# Patient Record
Sex: FEMALE | Race: WHITE | Employment: PART TIME | URBAN - METROPOLITAN AREA
[De-identification: names, ages, dates, MRNs, and addresses within clinical notes are randomized per-mention and may not be internally consistent; named-entity substitution may affect disease eponyms.]

---

## 2019-07-06 ENCOUNTER — HOSPITAL ENCOUNTER (EMERGENCY)
Age: 69
Discharge: HOME | End: 2019-07-06
Payer: MEDICARE

## 2019-07-06 VITALS
OXYGEN SATURATION: 93 % | BODY MASS INDEX: 26.68 KG/M2 | WEIGHT: 145 LBS | SYSTOLIC BLOOD PRESSURE: 131 MMHG | DIASTOLIC BLOOD PRESSURE: 75 MMHG | HEART RATE: 68 BPM | RESPIRATION RATE: 18 BRPM | HEIGHT: 62 IN | TEMPERATURE: 97.7 F

## 2019-07-06 DIAGNOSIS — K08.89 PAIN, DENTAL: Primary | ICD-10-CM

## 2019-07-06 PROCEDURE — 99212 OFFICE O/P EST SF 10 MIN: CPT

## 2019-07-06 RX ORDER — BUPROPION HYDROCHLORIDE 150 MG/1
150 TABLET, EXTENDED RELEASE ORAL DAILY
COMMUNITY

## 2019-07-06 RX ORDER — GABAPENTIN 100 MG/1
100 CAPSULE ORAL 3 TIMES DAILY
COMMUNITY

## 2019-07-06 RX ORDER — AMOXICILLIN 500 MG/1
500 CAPSULE ORAL 3 TIMES DAILY
Qty: 30 CAPSULE | Refills: 0 | Status: SHIPPED | OUTPATIENT
Start: 2019-07-06 | End: 2019-07-16

## 2019-07-06 RX ORDER — BUSPIRONE HYDROCHLORIDE 10 MG/1
10 TABLET ORAL 2 TIMES DAILY
COMMUNITY

## 2019-07-06 ASSESSMENT — PAIN DESCRIPTION - DESCRIPTORS: DESCRIPTORS: SHOOTING

## 2019-07-06 ASSESSMENT — PAIN DESCRIPTION - LOCATION: LOCATION: FACE;EAR

## 2019-07-06 ASSESSMENT — PAIN SCALES - WONG BAKER: WONGBAKER_NUMERICALRESPONSE: 2

## 2019-07-06 ASSESSMENT — PAIN DESCRIPTION - PROGRESSION: CLINICAL_PROGRESSION: NOT CHANGED

## 2019-07-06 ASSESSMENT — PAIN DESCRIPTION - ORIENTATION: ORIENTATION: RIGHT

## 2019-07-06 ASSESSMENT — PAIN DESCRIPTION - ONSET: ONSET: ON-GOING

## 2019-07-06 ASSESSMENT — PAIN SCALES - GENERAL: PAINLEVEL_OUTOF10: 2

## 2019-07-06 ASSESSMENT — PAIN DESCRIPTION - PAIN TYPE: TYPE: ACUTE PAIN

## 2019-07-06 ASSESSMENT — PAIN DESCRIPTION - FREQUENCY: FREQUENCY: CONTINUOUS

## 2019-07-06 NOTE — ED PROVIDER NOTES
soon as possible if not go to the dental clinic if pain is getting worse over the next day or to go to the ER at [facility] since they have dental residents. --------------------------------------------- PAST HISTORY ---------------------------------------------  Past Medical History:  has a past medical history of Hyperlipidemia, Hypertension, and Neuropathy. Past Surgical History:  has a past surgical history that includes Tonsillectomy; hernia repair; Appendectomy; Foot surgery; fracture surgery; and eye surgery. Social History:  reports that she quit smoking about a year ago. She does not have any smokeless tobacco history on file. She reports that she drinks about 1.2 oz of alcohol per week. She reports that she does not use drugs. Family History: family history is not on file. The patients home medications have been reviewed. Allergies: Patient has no known allergies. -------------------------------------------------- RESULTS -------------------------------------------------  No results found for this visit on 07/06/19. No orders to display       ------------------------- NURSING NOTES AND VITALS REVIEWED ---------------------------   The nursing notes within the ED encounter and vital signs as below have been reviewed. /75   Pulse 68   Temp 97.7 °F (36.5 °C) (Oral)   Resp 18   Ht 5' 2\" (1.575 m)   Wt 145 lb (65.8 kg)   SpO2 93%   BMI 26.52 kg/m²   Oxygen Saturation Interpretation: Normal      ------------------------------------------ PROGRESS NOTES ------------------------------------------   I have spoken with the patient and discussed todays results, in addition to providing specific details for the plan of care and counseling regarding the diagnosis and prognosis.   Their questions are answered at this time and they are agreeable with the plan.      --------------------------------- ADDITIONAL PROVIDER NOTES ---------------------------------     This

## 2022-09-19 ENCOUNTER — OFFICE VISIT (OUTPATIENT)
Dept: NEUROSURGERY | Age: 72
End: 2022-09-19
Payer: MEDICARE

## 2022-09-19 VITALS
WEIGHT: 145 LBS | TEMPERATURE: 97.5 F | BODY MASS INDEX: 26.68 KG/M2 | OXYGEN SATURATION: 96 % | RESPIRATION RATE: 16 BRPM | HEART RATE: 71 BPM | HEIGHT: 62 IN | DIASTOLIC BLOOD PRESSURE: 71 MMHG | SYSTOLIC BLOOD PRESSURE: 137 MMHG

## 2022-09-19 DIAGNOSIS — M48.02 CERVICAL STENOSIS OF SPINAL CANAL: Primary | ICD-10-CM

## 2022-09-19 PROCEDURE — 1090F PRES/ABSN URINE INCON ASSESS: CPT | Performed by: PHYSICIAN ASSISTANT

## 2022-09-19 PROCEDURE — G8427 DOCREV CUR MEDS BY ELIG CLIN: HCPCS | Performed by: PHYSICIAN ASSISTANT

## 2022-09-19 PROCEDURE — 3017F COLORECTAL CA SCREEN DOC REV: CPT | Performed by: PHYSICIAN ASSISTANT

## 2022-09-19 PROCEDURE — G8417 CALC BMI ABV UP PARAM F/U: HCPCS | Performed by: PHYSICIAN ASSISTANT

## 2022-09-19 PROCEDURE — 99202 OFFICE O/P NEW SF 15 MIN: CPT

## 2022-09-19 PROCEDURE — 1036F TOBACCO NON-USER: CPT | Performed by: PHYSICIAN ASSISTANT

## 2022-09-19 PROCEDURE — 99203 OFFICE O/P NEW LOW 30 MIN: CPT | Performed by: PHYSICIAN ASSISTANT

## 2022-09-19 PROCEDURE — 1123F ACP DISCUSS/DSCN MKR DOCD: CPT | Performed by: PHYSICIAN ASSISTANT

## 2022-09-19 PROCEDURE — G8400 PT W/DXA NO RESULTS DOC: HCPCS | Performed by: PHYSICIAN ASSISTANT

## 2022-09-19 RX ORDER — ALENDRONATE SODIUM 70 MG/1
TABLET ORAL
COMMUNITY
Start: 2022-08-15

## 2022-09-19 RX ORDER — BUSPIRONE HYDROCHLORIDE 15 MG/1
TABLET ORAL
COMMUNITY
Start: 2022-08-15

## 2022-09-19 ASSESSMENT — ENCOUNTER SYMPTOMS
GASTROINTESTINAL NEGATIVE: 1
ALLERGIC/IMMUNOLOGIC NEGATIVE: 1
EYES NEGATIVE: 1
RESPIRATORY NEGATIVE: 1

## 2022-09-19 NOTE — PROGRESS NOTES
Subjective:      Patient ID: Taylor Vickers is a 70 y.o. female. Neck Pain   This is a new problem. Episode onset: 4 months. The problem occurs constantly. The problem has been gradually worsening. The pain is present in the midline (and right arm pain into the hand. ). The quality of the pain is described as aching and shooting. The pain is at a severity of 9/10. She has tried chiropractic manipulation (gabapentin, motrin,) for the symptoms. The treatment provided mild relief. Review of Systems   Constitutional: Negative. HENT: Negative. Eyes: Negative. Respiratory: Negative. Cardiovascular: Negative. Gastrointestinal: Negative. Endocrine: Negative. Genitourinary: Negative. Musculoskeletal:  Positive for neck pain. Skin: Negative. Allergic/Immunologic: Negative. Neurological: Negative. Hematological: Negative. Psychiatric/Behavioral: Negative. Objective:   Physical Exam  Constitutional:       Appearance: Normal appearance. HENT:      Head: Normocephalic and atraumatic. Nose: Nose normal.   Eyes:      Pupils: Pupils are equal, round, and reactive to light. Pulmonary:      Effort: Pulmonary effort is normal.   Abdominal:      General: There is no distension. Skin:     General: Skin is warm and dry. Neurological:      Mental Status: She is alert. GCS: GCS eye subscore is 4. GCS verbal subscore is 5. GCS motor subscore is 6. Cranial Nerves: Cranial nerves are intact. Sensory: Sensory deficit present. Motor: Weakness present. Coordination: Heel to [facility] Test abnormal.      Gait: Gait abnormal.      Deep Tendon Reflexes:      Reflex Scores:       Tricep reflexes are 3+ on the right side and 3+ on the left side. Bicep reflexes are 3+ on the right side and 3+ on the left side. Brachioradialis reflexes are 3+ on the right side and 3+ on the left side.        Patellar reflexes are 3+ on the right side and 3+ on the left side.       Achilles reflexes are 3+ on the right side and 3+ on the left side. Comments: 4/5 hand intrinsics    Decreased sensation to LT in both hands   Psychiatric:         Mood and Affect: Mood normal.       Assessment:      70year old female with severe neck and right > left arm pain, numbness and weakness. She has had no relief with NSAIDS, gabapentin, and 2 different chiropractors. Plan:      She will follow up with her cervical MRI disc that was done at [facility].         ZEYAD Lau

## 2022-09-20 ENCOUNTER — OFFICE VISIT (OUTPATIENT)
Dept: NEUROSURGERY | Age: 72
End: 2022-09-20
Payer: MEDICARE

## 2022-09-20 VITALS
OXYGEN SATURATION: 94 % | BODY MASS INDEX: 26.68 KG/M2 | WEIGHT: 145 LBS | HEIGHT: 62 IN | HEART RATE: 84 BPM | SYSTOLIC BLOOD PRESSURE: 124 MMHG | DIASTOLIC BLOOD PRESSURE: 80 MMHG | TEMPERATURE: 97.2 F | RESPIRATION RATE: 16 BRPM

## 2022-09-20 DIAGNOSIS — M48.02 CERVICAL STENOSIS OF SPINAL CANAL: Primary | ICD-10-CM

## 2022-09-20 PROCEDURE — G8427 DOCREV CUR MEDS BY ELIG CLIN: HCPCS | Performed by: PHYSICIAN ASSISTANT

## 2022-09-20 PROCEDURE — 99202 OFFICE O/P NEW SF 15 MIN: CPT

## 2022-09-20 PROCEDURE — 1123F ACP DISCUSS/DSCN MKR DOCD: CPT | Performed by: PHYSICIAN ASSISTANT

## 2022-09-20 PROCEDURE — G8417 CALC BMI ABV UP PARAM F/U: HCPCS | Performed by: PHYSICIAN ASSISTANT

## 2022-09-20 PROCEDURE — 1090F PRES/ABSN URINE INCON ASSESS: CPT | Performed by: PHYSICIAN ASSISTANT

## 2022-09-20 PROCEDURE — 3017F COLORECTAL CA SCREEN DOC REV: CPT | Performed by: PHYSICIAN ASSISTANT

## 2022-09-20 PROCEDURE — 99213 OFFICE O/P EST LOW 20 MIN: CPT | Performed by: PHYSICIAN ASSISTANT

## 2022-09-20 PROCEDURE — G8400 PT W/DXA NO RESULTS DOC: HCPCS | Performed by: PHYSICIAN ASSISTANT

## 2022-09-20 PROCEDURE — 1036F TOBACCO NON-USER: CPT | Performed by: PHYSICIAN ASSISTANT

## 2022-09-20 ASSESSMENT — ENCOUNTER SYMPTOMS
EYES NEGATIVE: 1
RESPIRATORY NEGATIVE: 1
ALLERGIC/IMMUNOLOGIC NEGATIVE: 1
GASTROINTESTINAL NEGATIVE: 1

## 2022-09-20 NOTE — PROGRESS NOTES
Subjective:      Patient ID: Luis Alberto Soriano is a 70 y.o. female. Neck Pain   This is a new problem. Episode onset: 4 months. The problem occurs constantly. The problem has been gradually worsening. The pain is present in the midline (and right arm pain into the hand. ). The quality of the pain is described as aching and shooting. The pain is at a severity of 9/10. She has tried chiropractic manipulation (gabapentin, motrin,) for the symptoms. The treatment provided mild relief. Review of Systems   Constitutional: Negative. HENT: Negative. Eyes: Negative. Respiratory: Negative. Cardiovascular: Negative. Gastrointestinal: Negative. Endocrine: Negative. Genitourinary: Negative. Musculoskeletal:  Positive for neck pain. Skin: Negative. Allergic/Immunologic: Negative. Neurological: Negative. Hematological: Negative. Psychiatric/Behavioral: Negative. Objective:   Physical Exam  Constitutional:       Appearance: Normal appearance. HENT:      Head: Normocephalic and atraumatic. Nose: Nose normal.   Eyes:      Pupils: Pupils are equal, round, and reactive to light. Pulmonary:      Effort: Pulmonary effort is normal.   Abdominal:      General: There is no distension. Skin:     General: Skin is warm and dry. Neurological:      Mental Status: She is alert. GCS: GCS eye subscore is 4. GCS verbal subscore is 5. GCS motor subscore is 6. Cranial Nerves: Cranial nerves are intact. Sensory: Sensory deficit present. Motor: Weakness present. Coordination: Heel to [facility] Test abnormal.      Gait: Gait abnormal.      Deep Tendon Reflexes:      Reflex Scores:       Tricep reflexes are 3+ on the right side and 3+ on the left side. Bicep reflexes are 3+ on the right side and 3+ on the left side. Brachioradialis reflexes are 3+ on the right side and 3+ on the left side.        Patellar reflexes are 3+ on the right side and 3+ on the left side.       Achilles reflexes are 3+ on the right side and 3+ on the left side. Comments: 4/5 hand intrinsics    Decreased sensation to LT in both hands   Psychiatric:         Mood and Affect: Mood normal.       Assessment:      70year old female with severe neck and right > left arm pain, numbness and weakness. She has had no relief with NSAIDS, gabapentin, and 2 different chiropractors. 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 Aisha Barrett PA    I have interviewed and examined the patient and agree. 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 I am recommending C3-C4, C4-C5 and C5-C6 anterior cervical diskectomy and fusion.   She needs medical clearance    Deepak Lozada MD

## 2022-10-14 ENCOUNTER — TELEPHONE (OUTPATIENT)
Dept: NEUROSURGERY | Age: 72
End: 2022-10-14

## 2022-10-14 NOTE — TELEPHONE ENCOUNTER
Patient called stating she would like to schedule surgery. She is working on her PCP and cardio clearance.

## 2022-10-19 ENCOUNTER — PREP FOR PROCEDURE (OUTPATIENT)
Dept: NEUROSURGERY | Age: 72
End: 2022-10-19

## 2022-10-19 DIAGNOSIS — M50.20 DISPLACEMENT OF CERVICAL INTERVERTEBRAL DISC WITHOUT MYELOPATHY: Primary | ICD-10-CM

## 2022-10-24 ENCOUNTER — PREP FOR PROCEDURE (OUTPATIENT)
Dept: NEUROSURGERY | Age: 72
End: 2022-10-24

## 2022-10-24 DIAGNOSIS — Z01.818 PRE-OP TESTING: Primary | ICD-10-CM

## 2022-10-24 RX ORDER — SODIUM CHLORIDE 0.9 % (FLUSH) 0.9 %
5-40 SYRINGE (ML) INJECTION EVERY 12 HOURS SCHEDULED
Status: CANCELLED | OUTPATIENT
Start: 2022-10-24

## 2022-10-24 RX ORDER — SODIUM CHLORIDE 9 MG/ML
INJECTION, SOLUTION INTRAVENOUS PRN
Status: CANCELLED | OUTPATIENT
Start: 2022-10-24

## 2022-10-24 RX ORDER — SODIUM CHLORIDE 0.9 % (FLUSH) 0.9 %
5-40 SYRINGE (ML) INJECTION PRN
Status: CANCELLED | OUTPATIENT
Start: 2022-10-24

## 2022-10-24 RX ORDER — SODIUM CHLORIDE 9 MG/ML
INJECTION, SOLUTION INTRAVENOUS CONTINUOUS
Status: CANCELLED | OUTPATIENT
Start: 2022-10-24

## 2022-11-14 ENCOUNTER — PREP FOR PROCEDURE (OUTPATIENT)
Dept: NEUROSURGERY | Age: 72
End: 2022-11-14

## 2022-11-14 DIAGNOSIS — Z01.818 PRE-OP TESTING: Primary | ICD-10-CM

## 2022-11-14 RX ORDER — ALBUTEROL SULFATE 90 UG/1
AEROSOL, METERED RESPIRATORY (INHALATION)
COMMUNITY

## 2022-11-14 RX ORDER — BUPROPION HYDROCHLORIDE 150 MG/1
150 TABLET ORAL DAILY
COMMUNITY
Start: 2022-08-15 | End: 2022-11-14

## 2022-11-14 RX ORDER — ALENDRONATE SODIUM 70 MG/1
TABLET ORAL
COMMUNITY

## 2022-11-14 RX ORDER — ERGOCALCIFEROL (VITAMIN D2) 1250 MCG
50000 CAPSULE ORAL WEEKLY
COMMUNITY

## 2022-11-14 RX ORDER — BUSPIRONE HYDROCHLORIDE 15 MG/1
15 TABLET ORAL 2 TIMES DAILY
COMMUNITY

## 2022-11-14 RX ORDER — GABAPENTIN 300 MG/1
300 CAPSULE ORAL 3 TIMES DAILY
COMMUNITY
Start: 2022-08-15

## 2022-11-14 NOTE — PROGRESS NOTES
[address]   PRE-ADMISSION TESTING GENERAL INSTRUCTIONS  [facility] Phone Number: [phone]      GENERAL INSTRUCTIONS:    [x] Antibacterial Soap Shower Night before surgery. [x] CHG Wipes instruction sheet and wipes given. [x] Do not wear makeup, lotions, powders, deodorant. [x] Nothing by mouth after midnight; including gum, candy, mints, or water. [x] You may brush your teeth, gargle, but do NOT swallow water. [x] No tobacco products, illegal drugs, or alcohol within 24 hours of your surgery. [x] Jewelry or valuables should not be brought to the hospital. All body and/or tongue piercing's must be removed prior to arriving to hospital. No contact lens or hair pins. [x] Arrange transportation with a responsible adult  to and from the hospital. Arrange for someone to be with you for the remainder of the day and for 24 hours after your procedure due to having had anesthesia. -Who will be your  for transportation?___Tara__         -Who will be staying with you for 24 hrs after your procedure?__________________  [x] Bring insurance card and photo ID. [x] Transfusion Bracelet: Please bring with you to hospital, day of surgery. PARKING INSTRUCTIONS:     [x] ARRIVAL DATE & TIME: 11/28/22 @ 0730am  [x] Enter into the [employer] Two people may accompany you. Masks are not required but are recommended. [x] Parking Lot \"I\" is where you will park. It is located on the corner of [address] and [address]. The entrance is on [address]. Upon entering the parking lot, a voucher ticket will print    EDUCATION INSTRUCTIONS:        [x] Pre-admission Testing educational folder given  [x] Incentive Spirometry,coughing & deep breathing exercises reviewed. [x] Medication information sheet(s)   [x] Fluoroscopy-Xray used in surgery reviewed with patient. Educational pamphlet placed in chart. [x] Pain: Post-op pain is normal and to be expected.  You will be asked to rate your pain from 0-10. MEDICATION INSTRUCTIONS:    [x] Bring a complete list of your medications, please write the last time you took the medicine, give this list to the nurse in Pre-Op. [x] Take only the following medications the morning of surgery with 1-2 ounces of water: Buspar, gabapentin, wellbutrin, bring albuterol   [x] Stop all herbal supplements and vitamins 5 days before surgery. Stop NSAIDS 7 days before surgery. [x] Use your inhalers the morning of surgery. Bring your emergency inhaler with you day of surgery. [x] Follow physician instructions regarding any blood thinners you may be taking. WHAT TO EXPECT:    [x] The day of surgery you will be greeted and checked in by the [employer].  In addition, you will be registered in the [place] by a Patient Access Representative. Please bring your photo ID and insurance card. A nurse will greet you in accordance to the time you are needed in the pre-op area to prepare you for surgery. Please do not be discouraged if you are not greeted in the order you arrive as there are many variables that are involved in patient preparation. Your patience is greatly appreciated as you wait for your nurse. Please bring in items such as: books, magazines, newspapers, electronics, or any other items  to occupy your time in the waiting area. [x]  Delays may occur with surgery and staff will make a sincere effort to keep you informed of delays. If any delays occur with your procedure, we apologize ahead of time for your inconvenience as we recognize the value of your time.

## 2022-11-18 ENCOUNTER — HOSPITAL ENCOUNTER (OUTPATIENT)
Dept: GENERAL RADIOLOGY | Age: 72
Discharge: HOME | End: 2022-11-20
Payer: MEDICARE

## 2022-11-18 ENCOUNTER — HOSPITAL ENCOUNTER (OUTPATIENT)
Dept: PREADMISSION TESTING | Age: 72
Discharge: HOME | End: 2022-11-18
Payer: MEDICARE

## 2022-11-18 VITALS
RESPIRATION RATE: 18 BRPM | OXYGEN SATURATION: 97 % | HEART RATE: 73 BPM | DIASTOLIC BLOOD PRESSURE: 61 MMHG | SYSTOLIC BLOOD PRESSURE: 138 MMHG | WEIGHT: 143 LBS | TEMPERATURE: 97.2 F | BODY MASS INDEX: 26.31 KG/M2 | HEIGHT: 62 IN

## 2022-11-18 DIAGNOSIS — Z01.818 PRE-OP TESTING: ICD-10-CM

## 2022-11-18 DIAGNOSIS — R91.1 LUNG NODULE: Primary | ICD-10-CM

## 2022-11-18 DIAGNOSIS — Z01.812 PRE-OPERATIVE LABORATORY EXAMINATION: Primary | ICD-10-CM

## 2022-11-18 LAB
ABO/RH: NORMAL
ANION GAP SERPL CALCULATED.3IONS-SCNC: 13 MMOL/L (ref 7–16)
ANTIBODY SCREEN: NORMAL
BACTERIA: ABNORMAL /HPF
BASOPHILS ABSOLUTE: 0.05 E9/L (ref 0–0.2)
BASOPHILS RELATIVE PERCENT: 0.9 % (ref 0–2)
BILIRUBIN URINE: NEGATIVE
BLOOD, URINE: NEGATIVE
BUN BLDV-MCNC: 13 MG/DL (ref 6–23)
CALCIUM SERPL-MCNC: 9.4 MG/DL (ref 8.6–10.2)
CHLORIDE BLD-SCNC: 105 MMOL/L (ref 98–107)
CLARITY: CLEAR
CO2: 22 MMOL/L (ref 22–29)
COLOR: YELLOW
CREAT SERPL-MCNC: 0.6 MG/DL (ref 0.5–1)
EOSINOPHILS ABSOLUTE: 0.41 E9/L (ref 0.05–0.5)
EOSINOPHILS RELATIVE PERCENT: 7.1 % (ref 0–6)
GFR SERPL CREATININE-BSD FRML MDRD: >60 ML/MIN/1.73
GLUCOSE BLD-MCNC: 99 MG/DL (ref 74–99)
GLUCOSE URINE: NEGATIVE MG/DL
HCT VFR BLD CALC: 37.8 % (ref 34–48)
HEMOGLOBIN: 12 G/DL (ref 11.5–15.5)
IMMATURE GRANULOCYTES #: 0.02 E9/L
IMMATURE GRANULOCYTES %: 0.3 % (ref 0–5)
INR BLD: 1
KETONES, URINE: NEGATIVE MG/DL
LEUKOCYTE ESTERASE, URINE: ABNORMAL
LYMPHOCYTES ABSOLUTE: 1.88 E9/L (ref 1.5–4)
LYMPHOCYTES RELATIVE PERCENT: 32.7 % (ref 20–42)
MCH RBC QN AUTO: 31.6 PG (ref 26–35)
MCHC RBC AUTO-ENTMCNC: 31.7 % (ref 32–34.5)
MCV RBC AUTO: 99.5 FL (ref 80–99.9)
MONOCYTES ABSOLUTE: 0.44 E9/L (ref 0.1–0.95)
MONOCYTES RELATIVE PERCENT: 7.7 % (ref 2–12)
NEUTROPHILS ABSOLUTE: 2.95 E9/L (ref 1.8–7.3)
NEUTROPHILS RELATIVE PERCENT: 51.3 % (ref 43–80)
NITRITE, URINE: NEGATIVE
PDW BLD-RTO: 13.2 FL (ref 11.5–15)
PH UA: 6.5 (ref 5–9)
PLATELET # BLD: 210 E9/L (ref 130–450)
PMV BLD AUTO: 12 FL (ref 7–12)
POTASSIUM REFLEX MAGNESIUM: 4.3 MMOL/L (ref 3.5–5)
PROTEIN UA: NEGATIVE MG/DL
PROTHROMBIN TIME: 10.9 SEC (ref 9.3–12.4)
RBC # BLD: 3.8 E12/L (ref 3.5–5.5)
RBC UA: ABNORMAL /HPF (ref 0–2)
SODIUM BLD-SCNC: 140 MMOL/L (ref 132–146)
SPECIFIC GRAVITY UA: 1.01 (ref 1–1.03)
UROBILINOGEN, URINE: 0.2 E.U./DL
WBC # BLD: 5.8 E9/L (ref 4.5–11.5)
WBC UA: ABNORMAL /HPF (ref 0–5)

## 2022-11-18 PROCEDURE — 80048 BASIC METABOLIC PNL TOTAL CA: CPT

## 2022-11-18 PROCEDURE — 87081 CULTURE SCREEN ONLY: CPT

## 2022-11-18 PROCEDURE — 86900 BLOOD TYPING SEROLOGIC ABO: CPT

## 2022-11-18 PROCEDURE — 36415 COLL VENOUS BLD VENIPUNCTURE: CPT

## 2022-11-18 PROCEDURE — 85025 COMPLETE CBC W/AUTO DIFF WBC: CPT

## 2022-11-18 PROCEDURE — 86901 BLOOD TYPING SEROLOGIC RH(D): CPT

## 2022-11-18 PROCEDURE — 71046 X-RAY EXAM CHEST 2 VIEWS: CPT

## 2022-11-18 PROCEDURE — 87088 URINE BACTERIA CULTURE: CPT

## 2022-11-18 PROCEDURE — 85610 PROTHROMBIN TIME: CPT

## 2022-11-18 PROCEDURE — 81001 URINALYSIS AUTO W/SCOPE: CPT

## 2022-11-18 PROCEDURE — 86850 RBC ANTIBODY SCREEN: CPT

## 2022-11-19 LAB — MRSA CULTURE ONLY: NORMAL

## 2022-11-20 LAB — URINE CULTURE, ROUTINE: NORMAL

## 2022-11-24 ENCOUNTER — ANESTHESIA EVENT (OUTPATIENT)
Dept: OPERATING ROOM | Age: 72
DRG: 472 | End: 2022-11-24
Payer: MEDICARE

## 2022-11-25 NOTE — H&P
Neck Pain   This is a new problem. Episode onset: 4 months. The problem occurs constantly. The problem has been gradually worsening. The pain is present in the midline (and right arm pain into the hand. ). The quality of the pain is described as aching and shooting. The pain is at a severity of 9/10. She has tried chiropractic manipulation (gabapentin, motrin,) for the symptoms. The treatment provided mild relief. Past Medical History:   Diagnosis Date    Hyperlipidemia     Hypertension     Neuropathy      Past Surgical History:   Procedure Laterality Date    APPENDECTOMY      EYE SURGERY      FOOT SURGERY Bilateral     FRACTURE SURGERY      HERNIA REPAIR      TONSILLECTOMY       Social History     Socioeconomic History    Marital status:      Spouse name: Not on file    Number of children: Not on file    Years of education: Not on file    Highest education level: Not on file   Occupational History    Not on file   Tobacco Use    Smoking status: Former     Packs/day: 0.50     Years: 20.00     Pack years: 10.00     Types: Cigarettes     Quit date: 2018     Years since quittin.3    Smokeless tobacco: Never   Vaping Use    Vaping Use: Some days    Substances: Nicotine   Substance and Sexual Activity    Alcohol use: Yes     Alcohol/week: 2.0 standard drinks     Types: 1 Glasses of wine, 1 Cans of beer per week     Comment: Social Occasional     Drug use: No    Sexual activity: Not Currently   Other Topics Concern    Not on file   Social History Narrative    Not on file     Social Determinants of Health     Financial Resource Strain: Not on file   Food Insecurity: Not on file   Transportation Needs: Not on file   Physical Activity: Not on file   Stress: Not on file   Social Connections: Not on file   Intimate Partner Violence: Not on file   Housing Stability: Not on file     No family history on file. Scheduled Meds:  Continuous Infusions:  PRN Meds:.     No Known Allergies       Review of Systems Constitutional: Negative. HENT: Negative. Eyes: Negative. Respiratory: Negative. Cardiovascular: Negative. Gastrointestinal: Negative. Endocrine: Negative. Genitourinary: Negative. Musculoskeletal:  Positive for neck pain. Skin: Negative. Allergic/Immunologic: Negative. Neurological: Negative. Hematological: Negative. Psychiatric/Behavioral: Negative. Objective:   Physical Exam  Constitutional:       Appearance: Normal appearance. HENT:      Head: Normocephalic and atraumatic. Nose: Nose normal.   Eyes:      Pupils: Pupils are equal, round, and reactive to light. Pulmonary:      Effort: Pulmonary effort is normal.   Abdominal:      General: There is no distension. Skin:     General: Skin is warm and dry. Neurological:      Mental Status: She is alert. GCS: GCS eye subscore is 4. GCS verbal subscore is 5. GCS motor subscore is 6. Cranial Nerves: Cranial nerves are intact. Sensory: Sensory deficit present. Motor: Weakness present. Coordination: Heel to [place] [facility] Test abnormal.      Gait: Gait abnormal.      Deep Tendon Reflexes:      Reflex Scores:       Tricep reflexes are 3+ on the right side and 3+ on the left side. Bicep reflexes are 3+ on the right side and 3+ on the left side. Brachioradialis reflexes are 3+ on the right side and 3+ on the left side. Patellar reflexes are 3+ on the right side and 3+ on the left side. Achilles reflexes are 3+ on the right side and 3+ on the left side. Comments: 4/5 hand intrinsics     Decreased sensation to LT in both hands   Psychiatric:         Mood and Affect: Mood normal.         Assessment:   70year old female with severe neck and right > left arm pain, numbness and weakness. She has had no relief with NSAIDS, gabapentin, and 2 different chiropractors.   Cervical MRI reveals grade one anterolithesis C3-4 with severe stenosis and myelomalacia and foraminal stenosis at C4-C5 and C5-C6                Plan:   She will discuss cervical decompression and fusion with Dr. Jerris Dandy, PA     I have interviewed and examined the patient and agree. 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 I am recommending C3-C4, C4-C5 and C5-C6 anterior cervical diskectomy and fusion. She needs medical clearance.   R/B/A of surgery have been discussed and she wishes to proceed    I have examined the patient and reviewed thr H and P and the right arm is worse and this is no different from prior     Prince Fani MD

## 2022-11-28 ENCOUNTER — HOSPITAL ENCOUNTER (INPATIENT)
Age: 72
LOS: 2 days | Discharge: HOME | DRG: 473 | End: 2022-11-30
Attending: NEUROLOGICAL SURGERY | Admitting: NEUROLOGICAL SURGERY
Payer: MEDICARE

## 2022-11-28 ENCOUNTER — ANESTHESIA (OUTPATIENT)
Dept: OPERATING ROOM | Age: 72
DRG: 472 | End: 2022-11-28
Payer: MEDICARE

## 2022-11-28 ENCOUNTER — APPOINTMENT (OUTPATIENT)
Dept: GENERAL RADIOLOGY | Age: 72
DRG: 473 | End: 2022-11-28
Attending: NEUROLOGICAL SURGERY
Payer: MEDICARE

## 2022-11-28 DIAGNOSIS — M48.02 CERVICAL SPINAL STENOSIS: Primary | ICD-10-CM

## 2022-11-28 DIAGNOSIS — M50.20 DISPLACEMENT OF CERVICAL INTERVERTEBRAL DISC WITHOUT MYELOPATHY: ICD-10-CM

## 2022-11-28 PROCEDURE — 2500000003 HC RX 250 WO HCPCS: Performed by: NEUROLOGICAL SURGERY

## 2022-11-28 PROCEDURE — 6360000002 HC RX W HCPCS

## 2022-11-28 PROCEDURE — 7100000001 HC PACU RECOVERY - ADDTL 15 MIN: Performed by: NEUROLOGICAL SURGERY

## 2022-11-28 PROCEDURE — 22551 ARTHRD ANT NTRBDY CERVICAL: CPT | Performed by: NEUROLOGICAL SURGERY

## 2022-11-28 PROCEDURE — 88311 DECALCIFY TISSUE: CPT

## 2022-11-28 PROCEDURE — 3600000004 HC SURGERY LEVEL 4 BASE: Performed by: NEUROLOGICAL SURGERY

## 2022-11-28 PROCEDURE — 2500000003 HC RX 250 WO HCPCS

## 2022-11-28 PROCEDURE — 2580000003 HC RX 258: Performed by: STUDENT IN AN ORGANIZED HEALTH CARE EDUCATION/TRAINING PROGRAM

## 2022-11-28 PROCEDURE — 22552 ARTHRD ANT NTRBD CERVICAL EA: CPT | Performed by: NEUROLOGICAL SURGERY

## 2022-11-28 PROCEDURE — 3600000014 HC SURGERY LEVEL 4 ADDTL 15MIN: Performed by: NEUROLOGICAL SURGERY

## 2022-11-28 PROCEDURE — 7100000000 HC PACU RECOVERY - FIRST 15 MIN: Performed by: NEUROLOGICAL SURGERY

## 2022-11-28 PROCEDURE — 3209999900 FLUORO FOR SURGICAL PROCEDURES

## 2022-11-28 PROCEDURE — 88304 TISSUE EXAM BY PATHOLOGIST: CPT

## 2022-11-28 PROCEDURE — 3700000001 HC ADD 15 MINUTES (ANESTHESIA): Performed by: NEUROLOGICAL SURGERY

## 2022-11-28 PROCEDURE — A4217 STERILE WATER/SALINE, 500 ML: HCPCS | Performed by: NEUROLOGICAL SURGERY

## 2022-11-28 PROCEDURE — 1200000000 HC SEMI PRIVATE

## 2022-11-28 PROCEDURE — 6360000002 HC RX W HCPCS: Performed by: STUDENT IN AN ORGANIZED HEALTH CARE EDUCATION/TRAINING PROGRAM

## 2022-11-28 PROCEDURE — 0RB30ZZ EXCISION OF CERVICAL VERTEBRAL DISC, OPEN APPROACH: ICD-10-PCS | Performed by: NEUROLOGICAL SURGERY

## 2022-11-28 PROCEDURE — 0RG10K0 FUSION OF CERVICAL VERTEBRAL JOINT WITH NONAUTOLOGOUS TISSUE SUBSTITUTE, ANTERIOR APPROACH, ANTERIOR COLUMN, OPEN APPROACH: ICD-10-PCS | Performed by: NEUROLOGICAL SURGERY

## 2022-11-28 PROCEDURE — 6360000002 HC RX W HCPCS: Performed by: NEUROLOGICAL SURGERY

## 2022-11-28 PROCEDURE — 22845 INSERT SPINE FIXATION DEVICE: CPT | Performed by: NEUROLOGICAL SURGERY

## 2022-11-28 PROCEDURE — 6360000002 HC RX W HCPCS: Performed by: ANESTHESIOLOGIST ASSISTANT

## 2022-11-28 PROCEDURE — 6360000002 HC RX W HCPCS: Performed by: ANESTHESIOLOGY

## 2022-11-28 PROCEDURE — 2709999900 HC NON-CHARGEABLE SUPPLY: Performed by: NEUROLOGICAL SURGERY

## 2022-11-28 PROCEDURE — 6370000000 HC RX 637 (ALT 250 FOR IP): Performed by: NEUROLOGICAL SURGERY

## 2022-11-28 PROCEDURE — C1889 IMPLANT/INSERT DEVICE, NOC: HCPCS | Performed by: NEUROLOGICAL SURGERY

## 2022-11-28 PROCEDURE — 3700000000 HC ANESTHESIA ATTENDED CARE: Performed by: NEUROLOGICAL SURGERY

## 2022-11-28 PROCEDURE — 2500000003 HC RX 250 WO HCPCS: Performed by: ANESTHESIOLOGY

## 2022-11-28 PROCEDURE — 2580000003 HC RX 258: Performed by: NEUROLOGICAL SURGERY

## 2022-11-28 PROCEDURE — C1713 ANCHOR/SCREW BN/BN,TIS/BN: HCPCS | Performed by: NEUROLOGICAL SURGERY

## 2022-11-28 PROCEDURE — 2720000010 HC SURG SUPPLY STERILE: Performed by: NEUROLOGICAL SURGERY

## 2022-11-28 DEVICE — XTEND ANTERIOR CERVICAL PLATE, 3-LEVEL, 42MM
Type: IMPLANTABLE DEVICE | Site: NECK | Status: FUNCTIONAL
Brand: XTEND

## 2022-11-28 DEVICE — IMPLANTABLE DEVICE: Type: IMPLANTABLE DEVICE | Site: NECK | Status: FUNCTIONAL

## 2022-11-28 DEVICE — XTEND 4.2MM VARIABLE ANGLE SCREW, SELF-DRILLING, 14MM
Type: IMPLANTABLE DEVICE | Site: NECK | Status: FUNCTIONAL
Brand: XTEND

## 2022-11-28 DEVICE — GRAFT BNE SUB W12XH7XL14MM CANC CORT ANT CERV INTBDY FUS: Type: IMPLANTABLE DEVICE | Site: NECK | Status: FUNCTIONAL

## 2022-11-28 RX ORDER — LABETALOL HYDROCHLORIDE 5 MG/ML
10 INJECTION, SOLUTION INTRAVENOUS ONCE
Status: COMPLETED | OUTPATIENT
Start: 2022-11-28 | End: 2022-11-28

## 2022-11-28 RX ORDER — ONDANSETRON 4 MG/1
4 TABLET, ORALLY DISINTEGRATING ORAL EVERY 8 HOURS PRN
Status: DISCONTINUED | OUTPATIENT
Start: 2022-11-28 | End: 2022-11-30 | Stop reason: HOSPADM

## 2022-11-28 RX ORDER — IPRATROPIUM BROMIDE AND ALBUTEROL SULFATE 2.5; .5 MG/3ML; MG/3ML
1 SOLUTION RESPIRATORY (INHALATION)
Status: DISCONTINUED | OUTPATIENT
Start: 2022-11-28 | End: 2022-11-28 | Stop reason: HOSPADM

## 2022-11-28 RX ORDER — DOCUSATE SODIUM 100 MG/1
200 CAPSULE, LIQUID FILLED ORAL 2 TIMES DAILY
Status: DISCONTINUED | OUTPATIENT
Start: 2022-11-28 | End: 2022-11-30 | Stop reason: HOSPADM

## 2022-11-28 RX ORDER — ALBUTEROL SULFATE 2.5 MG/3ML
2.5 SOLUTION RESPIRATORY (INHALATION)
Status: DISCONTINUED | OUTPATIENT
Start: 2022-11-28 | End: 2022-11-29

## 2022-11-28 RX ORDER — SODIUM CHLORIDE 0.9 % (FLUSH) 0.9 %
5-40 SYRINGE (ML) INJECTION PRN
Status: DISCONTINUED | OUTPATIENT
Start: 2022-11-28 | End: 2022-11-28 | Stop reason: HOSPADM

## 2022-11-28 RX ORDER — DIPHENHYDRAMINE HYDROCHLORIDE 50 MG/ML
12.5 INJECTION INTRAMUSCULAR; INTRAVENOUS
Status: DISCONTINUED | OUTPATIENT
Start: 2022-11-28 | End: 2022-11-28 | Stop reason: HOSPADM

## 2022-11-28 RX ORDER — PRAVASTATIN SODIUM 20 MG
40 TABLET ORAL DAILY
Status: DISCONTINUED | OUTPATIENT
Start: 2022-11-28 | End: 2022-11-30 | Stop reason: HOSPADM

## 2022-11-28 RX ORDER — DIPHENHYDRAMINE HCL 25 MG
25 TABLET ORAL EVERY 6 HOURS PRN
Status: DISCONTINUED | OUTPATIENT
Start: 2022-11-28 | End: 2022-11-30 | Stop reason: HOSPADM

## 2022-11-28 RX ORDER — ONDANSETRON 2 MG/ML
INJECTION INTRAMUSCULAR; INTRAVENOUS PRN
Status: DISCONTINUED | OUTPATIENT
Start: 2022-11-28 | End: 2022-11-28 | Stop reason: SDUPTHER

## 2022-11-28 RX ORDER — LABETALOL HYDROCHLORIDE 5 MG/ML
5 INJECTION, SOLUTION INTRAVENOUS
Status: COMPLETED | OUTPATIENT
Start: 2022-11-28 | End: 2022-11-28

## 2022-11-28 RX ORDER — ROCURONIUM BROMIDE 10 MG/ML
INJECTION, SOLUTION INTRAVENOUS PRN
Status: DISCONTINUED | OUTPATIENT
Start: 2022-11-28 | End: 2022-11-28 | Stop reason: SDUPTHER

## 2022-11-28 RX ORDER — VANCOMYCIN HYDROCHLORIDE 500 MG/10ML
INJECTION, POWDER, LYOPHILIZED, FOR SOLUTION INTRAVENOUS PRN
Status: DISCONTINUED | OUTPATIENT
Start: 2022-11-28 | End: 2022-11-28 | Stop reason: ALTCHOICE

## 2022-11-28 RX ORDER — MIDAZOLAM HYDROCHLORIDE 2 MG/2ML
2 INJECTION, SOLUTION INTRAMUSCULAR; INTRAVENOUS
Status: DISCONTINUED | OUTPATIENT
Start: 2022-11-28 | End: 2022-11-28 | Stop reason: HOSPADM

## 2022-11-28 RX ORDER — SODIUM CHLORIDE 9 MG/ML
INJECTION, SOLUTION INTRAVENOUS CONTINUOUS
Status: DISCONTINUED | OUTPATIENT
Start: 2022-11-28 | End: 2022-11-30 | Stop reason: HOSPADM

## 2022-11-28 RX ORDER — HYDROMORPHONE HCL 110MG/55ML
PATIENT CONTROLLED ANALGESIA SYRINGE INTRAVENOUS PRN
Status: DISCONTINUED | OUTPATIENT
Start: 2022-11-28 | End: 2022-11-28 | Stop reason: SDUPTHER

## 2022-11-28 RX ORDER — OXYCODONE HYDROCHLORIDE 5 MG/1
5 TABLET ORAL EVERY 4 HOURS PRN
Status: DISCONTINUED | OUTPATIENT
Start: 2022-11-28 | End: 2022-11-30 | Stop reason: HOSPADM

## 2022-11-28 RX ORDER — MORPHINE SULFATE 2 MG/ML
2 INJECTION, SOLUTION INTRAMUSCULAR; INTRAVENOUS
Status: DISCONTINUED | OUTPATIENT
Start: 2022-11-28 | End: 2022-11-30 | Stop reason: HOSPADM

## 2022-11-28 RX ORDER — DIPHENHYDRAMINE HYDROCHLORIDE 50 MG/ML
25 INJECTION INTRAMUSCULAR; INTRAVENOUS EVERY 6 HOURS PRN
Status: DISCONTINUED | OUTPATIENT
Start: 2022-11-28 | End: 2022-11-30 | Stop reason: HOSPADM

## 2022-11-28 RX ORDER — OXYCODONE HYDROCHLORIDE 10 MG/1
10 TABLET ORAL EVERY 4 HOURS PRN
Status: DISCONTINUED | OUTPATIENT
Start: 2022-11-28 | End: 2022-11-30 | Stop reason: HOSPADM

## 2022-11-28 RX ORDER — DIAZEPAM 5 MG/1
5 TABLET ORAL EVERY 6 HOURS PRN
Status: DISCONTINUED | OUTPATIENT
Start: 2022-11-28 | End: 2022-11-30 | Stop reason: HOSPADM

## 2022-11-28 RX ORDER — SODIUM CHLORIDE 9 MG/ML
25 INJECTION, SOLUTION INTRAVENOUS PRN
Status: DISCONTINUED | OUTPATIENT
Start: 2022-11-28 | End: 2022-11-28 | Stop reason: HOSPADM

## 2022-11-28 RX ORDER — ACETAMINOPHEN 325 MG/1
650 TABLET ORAL EVERY 6 HOURS
Status: DISCONTINUED | OUTPATIENT
Start: 2022-11-28 | End: 2022-11-30 | Stop reason: HOSPADM

## 2022-11-28 RX ORDER — SODIUM CHLORIDE 9 MG/ML
INJECTION, SOLUTION INTRAVENOUS PRN
Status: DISCONTINUED | OUTPATIENT
Start: 2022-11-28 | End: 2022-11-30 | Stop reason: HOSPADM

## 2022-11-28 RX ORDER — SODIUM CHLORIDE 0.9 % (FLUSH) 0.9 %
5-40 SYRINGE (ML) INJECTION EVERY 12 HOURS SCHEDULED
Status: DISCONTINUED | OUTPATIENT
Start: 2022-11-28 | End: 2022-11-28 | Stop reason: HOSPADM

## 2022-11-28 RX ORDER — FENTANYL CITRATE 50 UG/ML
INJECTION, SOLUTION INTRAMUSCULAR; INTRAVENOUS PRN
Status: DISCONTINUED | OUTPATIENT
Start: 2022-11-28 | End: 2022-11-28 | Stop reason: SDUPTHER

## 2022-11-28 RX ORDER — BISACODYL 5 MG/1
5 TABLET, DELAYED RELEASE ORAL DAILY
Status: DISCONTINUED | OUTPATIENT
Start: 2022-11-28 | End: 2022-11-30 | Stop reason: HOSPADM

## 2022-11-28 RX ORDER — POLYETHYLENE GLYCOL 3350 17 G/17G
17 POWDER, FOR SOLUTION ORAL DAILY
Status: DISCONTINUED | OUTPATIENT
Start: 2022-11-28 | End: 2022-11-30 | Stop reason: HOSPADM

## 2022-11-28 RX ORDER — SODIUM CHLORIDE 9 MG/ML
INJECTION, SOLUTION INTRAVENOUS CONTINUOUS
Status: DISCONTINUED | OUTPATIENT
Start: 2022-11-28 | End: 2022-11-28 | Stop reason: HOSPADM

## 2022-11-28 RX ORDER — BISACODYL 10 MG
10 SUPPOSITORY, RECTAL RECTAL DAILY PRN
Status: DISCONTINUED | OUTPATIENT
Start: 2022-11-28 | End: 2022-11-30 | Stop reason: HOSPADM

## 2022-11-28 RX ORDER — ONDANSETRON 2 MG/ML
4 INJECTION INTRAMUSCULAR; INTRAVENOUS EVERY 6 HOURS PRN
Status: DISCONTINUED | OUTPATIENT
Start: 2022-11-28 | End: 2022-11-30 | Stop reason: HOSPADM

## 2022-11-28 RX ORDER — ACETAMINOPHEN 325 MG/1
650 TABLET ORAL
Status: DISCONTINUED | OUTPATIENT
Start: 2022-11-28 | End: 2022-11-28 | Stop reason: HOSPADM

## 2022-11-28 RX ORDER — SENNA AND DOCUSATE SODIUM 50; 8.6 MG/1; MG/1
1 TABLET, FILM COATED ORAL 2 TIMES DAILY
Status: DISCONTINUED | OUTPATIENT
Start: 2022-11-28 | End: 2022-11-30 | Stop reason: HOSPADM

## 2022-11-28 RX ORDER — SODIUM CHLORIDE 0.9 % (FLUSH) 0.9 %
5-40 SYRINGE (ML) INJECTION EVERY 12 HOURS SCHEDULED
Status: DISCONTINUED | OUTPATIENT
Start: 2022-11-28 | End: 2022-11-30 | Stop reason: HOSPADM

## 2022-11-28 RX ORDER — HYDRALAZINE HYDROCHLORIDE 20 MG/ML
5 INJECTION INTRAMUSCULAR; INTRAVENOUS
Status: COMPLETED | OUTPATIENT
Start: 2022-11-28 | End: 2022-11-28

## 2022-11-28 RX ORDER — LABETALOL HYDROCHLORIDE 5 MG/ML
INJECTION, SOLUTION INTRAVENOUS
Status: COMPLETED
Start: 2022-11-28 | End: 2022-11-28

## 2022-11-28 RX ORDER — ONDANSETRON 2 MG/ML
4 INJECTION INTRAMUSCULAR; INTRAVENOUS
Status: DISCONTINUED | OUTPATIENT
Start: 2022-11-28 | End: 2022-11-28 | Stop reason: HOSPADM

## 2022-11-28 RX ORDER — PROPOFOL 10 MG/ML
INJECTION, EMULSION INTRAVENOUS PRN
Status: DISCONTINUED | OUTPATIENT
Start: 2022-11-28 | End: 2022-11-28 | Stop reason: SDUPTHER

## 2022-11-28 RX ORDER — DEXAMETHASONE SODIUM PHOSPHATE 10 MG/ML
INJECTION INTRAMUSCULAR; INTRAVENOUS PRN
Status: DISCONTINUED | OUTPATIENT
Start: 2022-11-28 | End: 2022-11-28 | Stop reason: SDUPTHER

## 2022-11-28 RX ORDER — MORPHINE SULFATE 4 MG/ML
4 INJECTION, SOLUTION INTRAMUSCULAR; INTRAVENOUS
Status: DISCONTINUED | OUTPATIENT
Start: 2022-11-28 | End: 2022-11-30 | Stop reason: HOSPADM

## 2022-11-28 RX ORDER — LIDOCAINE HYDROCHLORIDE 20 MG/ML
INJECTION, SOLUTION INTRAVENOUS PRN
Status: DISCONTINUED | OUTPATIENT
Start: 2022-11-28 | End: 2022-11-28 | Stop reason: SDUPTHER

## 2022-11-28 RX ORDER — BUSPIRONE HYDROCHLORIDE 7.5 MG/1
15 TABLET ORAL 2 TIMES DAILY
Status: DISCONTINUED | OUTPATIENT
Start: 2022-11-28 | End: 2022-11-30 | Stop reason: HOSPADM

## 2022-11-28 RX ORDER — DEXAMETHASONE SODIUM PHOSPHATE 4 MG/ML
4 INJECTION, SOLUTION INTRA-ARTICULAR; INTRALESIONAL; INTRAMUSCULAR; INTRAVENOUS; SOFT TISSUE EVERY 6 HOURS
Status: DISCONTINUED | OUTPATIENT
Start: 2022-11-28 | End: 2022-11-30 | Stop reason: HOSPADM

## 2022-11-28 RX ORDER — GABAPENTIN 300 MG/1
300 CAPSULE ORAL 3 TIMES DAILY
Status: DISCONTINUED | OUTPATIENT
Start: 2022-11-28 | End: 2022-11-30 | Stop reason: HOSPADM

## 2022-11-28 RX ORDER — ENEMA 19; 7 G/133ML; G/133ML
1 ENEMA RECTAL DAILY PRN
Status: DISCONTINUED | OUTPATIENT
Start: 2022-11-28 | End: 2022-11-30 | Stop reason: HOSPADM

## 2022-11-28 RX ORDER — DROPERIDOL 2.5 MG/ML
0.62 INJECTION, SOLUTION INTRAMUSCULAR; INTRAVENOUS
Status: DISCONTINUED | OUTPATIENT
Start: 2022-11-28 | End: 2022-11-28 | Stop reason: HOSPADM

## 2022-11-28 RX ORDER — BUPROPION HYDROCHLORIDE 150 MG/1
150 TABLET, EXTENDED RELEASE ORAL DAILY
Status: DISCONTINUED | OUTPATIENT
Start: 2022-11-28 | End: 2022-11-30 | Stop reason: HOSPADM

## 2022-11-28 RX ORDER — SODIUM CHLORIDE 0.9 % (FLUSH) 0.9 %
5-40 SYRINGE (ML) INJECTION PRN
Status: DISCONTINUED | OUTPATIENT
Start: 2022-11-28 | End: 2022-11-30 | Stop reason: HOSPADM

## 2022-11-28 RX ORDER — TRAMADOL HYDROCHLORIDE 50 MG/1
50 TABLET ORAL
Status: DISCONTINUED | OUTPATIENT
Start: 2022-11-28 | End: 2022-11-28 | Stop reason: HOSPADM

## 2022-11-28 RX ORDER — SODIUM CHLORIDE 9 MG/ML
INJECTION, SOLUTION INTRAVENOUS PRN
Status: DISCONTINUED | OUTPATIENT
Start: 2022-11-28 | End: 2022-11-28 | Stop reason: HOSPADM

## 2022-11-28 RX ORDER — MIDAZOLAM HYDROCHLORIDE 1 MG/ML
INJECTION INTRAMUSCULAR; INTRAVENOUS PRN
Status: DISCONTINUED | OUTPATIENT
Start: 2022-11-28 | End: 2022-11-28 | Stop reason: SDUPTHER

## 2022-11-28 RX ORDER — LOSARTAN POTASSIUM 50 MG/1
50 TABLET ORAL DAILY
Status: DISCONTINUED | OUTPATIENT
Start: 2022-11-28 | End: 2022-11-30 | Stop reason: HOSPADM

## 2022-11-28 RX ADMIN — PROPOFOL 120 MG: 10 INJECTION, EMULSION INTRAVENOUS at 11:31

## 2022-11-28 RX ADMIN — LABETALOL HYDROCHLORIDE 5 MG: 5 INJECTION INTRAVENOUS at 14:33

## 2022-11-28 RX ADMIN — OXYCODONE HYDROCHLORIDE 10 MG: 10 TABLET ORAL at 21:39

## 2022-11-28 RX ADMIN — FENTANYL CITRATE 50 MCG: 50 INJECTION, SOLUTION INTRAMUSCULAR; INTRAVENOUS at 12:14

## 2022-11-28 RX ADMIN — FENTANYL CITRATE 50 MCG: 50 INJECTION, SOLUTION INTRAMUSCULAR; INTRAVENOUS at 12:05

## 2022-11-28 RX ADMIN — GABAPENTIN 300 MG: 300 CAPSULE ORAL at 21:37

## 2022-11-28 RX ADMIN — LABETALOL HYDROCHLORIDE 5 MG: 5 INJECTION INTRAVENOUS at 15:18

## 2022-11-28 RX ADMIN — POLYETHYLENE GLYCOL 3350 17 G: 17 POWDER, FOR SOLUTION ORAL at 17:04

## 2022-11-28 RX ADMIN — SODIUM CHLORIDE: 9 INJECTION, SOLUTION INTRAVENOUS at 11:22

## 2022-11-28 RX ADMIN — DEXAMETHASONE SODIUM PHOSPHATE 10 MG: 10 INJECTION INTRAMUSCULAR; INTRAVENOUS at 11:35

## 2022-11-28 RX ADMIN — SODIUM CHLORIDE: 9 INJECTION, SOLUTION INTRAVENOUS at 17:07

## 2022-11-28 RX ADMIN — ONDANSETRON 4 MG: 2 INJECTION INTRAMUSCULAR; INTRAVENOUS at 11:35

## 2022-11-28 RX ADMIN — FENTANYL CITRATE 50 MCG: 50 INJECTION, SOLUTION INTRAMUSCULAR; INTRAVENOUS at 12:10

## 2022-11-28 RX ADMIN — DEXAMETHASONE SODIUM PHOSPHATE 4 MG: 4 INJECTION, SOLUTION INTRAMUSCULAR; INTRAVENOUS at 18:23

## 2022-11-28 RX ADMIN — BISACODYL 5 MG: 5 TABLET, COATED ORAL at 17:04

## 2022-11-28 RX ADMIN — OXYCODONE HYDROCHLORIDE 10 MG: 10 TABLET ORAL at 17:04

## 2022-11-28 RX ADMIN — GABAPENTIN 300 MG: 300 CAPSULE ORAL at 17:04

## 2022-11-28 RX ADMIN — SODIUM CHLORIDE, PRESERVATIVE FREE 10 ML: 5 INJECTION INTRAVENOUS at 21:48

## 2022-11-28 RX ADMIN — DIAZEPAM 5 MG: 5 TABLET ORAL at 17:04

## 2022-11-28 RX ADMIN — HYDROMORPHONE HYDROCHLORIDE 1 MG: 2 INJECTION, SOLUTION INTRAMUSCULAR; INTRAVENOUS; SUBCUTANEOUS at 12:20

## 2022-11-28 RX ADMIN — FENTANYL CITRATE 100 MCG: 50 INJECTION, SOLUTION INTRAMUSCULAR; INTRAVENOUS at 11:31

## 2022-11-28 RX ADMIN — ROCURONIUM BROMIDE 50 MG: 10 INJECTION, SOLUTION INTRAVENOUS at 11:31

## 2022-11-28 RX ADMIN — LABETALOL HYDROCHLORIDE 10 MG: 5 INJECTION INTRAVENOUS at 15:42

## 2022-11-28 RX ADMIN — ACETAMINOPHEN 650 MG: 325 TABLET, FILM COATED ORAL at 17:04

## 2022-11-28 RX ADMIN — WATER 2000 MG: 1 INJECTION INTRAMUSCULAR; INTRAVENOUS; SUBCUTANEOUS at 21:41

## 2022-11-28 RX ADMIN — MIDAZOLAM 2 MG: 1 INJECTION INTRAMUSCULAR; INTRAVENOUS at 11:22

## 2022-11-28 RX ADMIN — LIDOCAINE HYDROCHLORIDE 100 MG: 20 INJECTION, SOLUTION INTRAVENOUS at 11:31

## 2022-11-28 RX ADMIN — BUSPIRONE HYDROCHLORIDE 15 MG: 7.5 TABLET ORAL at 21:43

## 2022-11-28 RX ADMIN — CEFAZOLIN 2000 MG: 2 INJECTION, POWDER, FOR SOLUTION INTRAMUSCULAR; INTRAVENOUS at 11:45

## 2022-11-28 RX ADMIN — HYDROMORPHONE HYDROCHLORIDE 0.5 MG: 1 INJECTION, SOLUTION INTRAMUSCULAR; INTRAVENOUS; SUBCUTANEOUS at 15:19

## 2022-11-28 RX ADMIN — LABETALOL HYDROCHLORIDE: 5 INJECTION INTRAVENOUS at 17:49

## 2022-11-28 RX ADMIN — SODIUM CHLORIDE: 9 INJECTION, SOLUTION INTRAVENOUS at 11:56

## 2022-11-28 ASSESSMENT — PAIN DESCRIPTION - DESCRIPTORS
DESCRIPTORS: SORE;TIGHTNESS;DISCOMFORT
DESCRIPTORS: PRESSURE;TENDER;SORE

## 2022-11-28 ASSESSMENT — PAIN - FUNCTIONAL ASSESSMENT: PAIN_FUNCTIONAL_ASSESSMENT: NONE - DENIES PAIN

## 2022-11-28 ASSESSMENT — PAIN DESCRIPTION - ORIENTATION: ORIENTATION: MID

## 2022-11-28 ASSESSMENT — PAIN DESCRIPTION - LOCATION
LOCATION: SHOULDER;NECK
LOCATION: NECK

## 2022-11-28 ASSESSMENT — PAIN SCALES - GENERAL
PAINLEVEL_OUTOF10: 8
PAINLEVEL_OUTOF10: 7

## 2022-11-28 ASSESSMENT — LIFESTYLE VARIABLES: SMOKING_STATUS: 1

## 2022-11-28 NOTE — BRIEF OP NOTE
Brief Postoperative Note      Patient: Uday Patel  YOB: 1950  MRN: [id number]    Date of Procedure: 11/28/2022    Pre-Op Diagnosis: Displacement of cervical intervertebral disc without myelopathy [M50.20]    Post-Op Diagnosis: Same       Procedure(s):  C3-C4,C4-C5,C5-C6 ANTERIOR CERVICAL DISCECTOMY AND FUSION, C-ARM, REGULAR BED WITH HORSESHOE    Surgeon(s):  Chayo Aviles MD    Assistant:  Resident: Daphney Young DO    Anesthesia: General    Estimated Blood Loss (mL): Minimal    Complications: None    Specimens:   ID Type Source Tests Collected by Time Destination   A : CERVICAL DISC Tissue Tissue SURGICAL PATHOLOGY Chayo Aviles MD 11/28/2022 1326        Implants:  Implant Name Type Inv.  Item Serial No.  Lot No. LRB No. Used Action   GRAFT BNE SUB [id number] CANC JOSE ANT CERV INTBDY FUS - [id number]  GRAFT BNE SUB [id number] CANC JOSE ANT CERV INTBDY FUS [id number] [employer]  N/A 1 Implanted   SPACER SPNL 0 DEG [id number] MM CERV FORGE - [id number]  SPACER SPNL 0 DEG [id number] MM CERV FORGE [id number] [employer]  N/A 1 Implanted   SPACER SPNL 0 DEG [id number] MM CERV FORGE - [id number]  SPACER SPNL 0 DEG [id number] MM CERV FORGE [id number] [employer]  N/A 1 Implanted   SPACER SPNL 0 DEG [id number] MM CERV FORGE - [id number]  SPACER SPNL 0 DEG [id number] MM CERV FORGE [id number] [employer]  N/A 1 Implanted         Drains:   Urinary Catheter 11/28/22 2 Way (Active)       Findings: see dictated op note    Electronically signed by Carine Davis MD on 11/28/2022 at 1:36 PM

## 2022-11-28 NOTE — PROGRESS NOTES
Call placed to Dr. Lawyer Ambrosio and notified that patient has received a total of 10mg of labetalol so far in PACU and that current BP is 175/97 and pulse is 76. Order obtained for 10mg of IV labetalol once.   Electronically signed by Jadyn Brown RN on 11/28/2022 at 3:35 PM

## 2022-11-28 NOTE — ANESTHESIA PRE PROCEDURE
Department of Anesthesiology  Preprocedure Note       Name:  April Dubois   Age:  70 y.o.  :  1950                                          MRN:  [id number]         Date:  2022      Surgeon: Isaiah Quick):  Divya Shrestha MD    Procedure: Procedure(s):  C3-C4,C4-C5,C5-C6 ANTERIOR CERVICAL DISCECTOMY AND FUSION, C-ARM, REGULAR BED WITH HORSESHOE    Medications prior to admission:   Prior to Admission medications    Medication Sig Start Date End Date Taking? Authorizing Provider   gabapentin (NEURONTIN) 300 MG capsule Take 300 mg by mouth in the morning, at noon, and at bedtime. 8/15/22   Historical Provider, MD   busPIRone (BUSPAR) 15 MG tablet Take 15 mg by mouth in the morning and at bedtime    Historical Provider, MD   ergocalciferol (ERGOCALCIFEROL) 1.25 MG (49055 UT) capsule Take 50,000 Units by mouth once a week     Historical Provider, MD   albuterol sulfate HFA (PROVENTIL;VENTOLIN;PROAIR) 108 (90 Base) MCG/ACT inhaler Inhale 2 puffs every 4 hours by inhalation route as directed for 90 days.     Historical Provider, MD   alendronate (FOSAMAX) 70 MG tablet     Historical Provider, MD   buPROPion [facility] SR) 150 MG extended release tablet Take 150 mg by mouth daily    Historical Provider, MD   ibuprofen (ADVIL;MOTRIN) 800 MG tablet Take 1 tablet by mouth every 6 hours as needed for Pain 16   Melonie Patton, APRN - CNP   losartan (COZAAR) 50 MG tablet Take 50 mg by mouth daily    Historical Provider, MD   pravastatin (PRAVACHOL) 40 MG tablet Take 40 mg by mouth daily    Historical Provider, MD       Current medications:    Current Facility-Administered Medications   Medication Dose Route Frequency Provider Last Rate Last Admin    sodium chloride flush 0.9 % injection 5-40 mL  5-40 mL IntraVENous 2 times per day Luciano Snow        sodium chloride flush 0.9 % injection 5-40 mL  5-40 mL IntraVENous PRN ZEYAD Snow        0.9 % sodium chloride infusion   IntraVENous PRN ZEYAD Roman        0.9 % sodium chloride infusion   IntraVENous Continuous Luciano Roman        ceFAZolin (ANCEF) 2,000 mg in sterile water 20 mL IV syringe  2,000 mg IntraVENous On Call to [id number] ZEYAD Kenyon           Allergies:  No Known Allergies    Problem List:    Patient Active Problem List   Diagnosis Code    Displacement of cervical intervertebral disc without myelopathy M50.20    Cervical spinal stenosis M48.02       Past Medical History:        Diagnosis Date    Hyperlipidemia     Hypertension     Neuropathy        Past Surgical History:        Procedure Laterality Date    APPENDECTOMY      EYE SURGERY      FOOT SURGERY Bilateral     FRACTURE SURGERY      HERNIA REPAIR      TONSILLECTOMY         Social History:    Social History     Tobacco Use    Smoking status: Former     Packs/day: 0.50     Years: 20.00     Pack years: 10.00     Types: Cigarettes     Quit date: 2018     Years since quittin.4    Smokeless tobacco: Never   Substance Use Topics    Alcohol use:  Yes     Alcohol/week: 2.0 standard drinks     Types: 1 Glasses of wine, 1 Cans of beer per week     Comment: Social Occasional                                 Counseling given: Not Answered      Vital Signs (Current):   Vitals:    22 0805   BP: (!) 147/71   Pulse: 73   Resp: 20   Temp: 97.9 °F (36.6 °C)   SpO2: 95%   Weight: 143 lb (64.9 kg)   Height: 5' 2\" (1.575 m)                                              BP Readings from Last 3 Encounters:   22 (!) 147/71   22 138/61   22 124/80       NPO Status: Time of last liquid consumption: 2200                        Time of last solid consumption: 1730                        Date of last liquid consumption: 22                        Date of last solid food consumption: 22    BMI:   Wt Readings from Last 3 Encounters:   22 143 lb (64.9 kg)   22 143 lb (64.9 kg)   22 145 lb (65.8 kg)     Body mass index is 26.16 kg/m². CBC:   Lab Results   Component Value Date/Time    WBC 5.8 11/18/2022 09:00 AM    RBC 3.80 11/18/2022 09:00 AM    HGB 12.0 11/18/2022 09:00 AM    HCT 37.8 11/18/2022 09:00 AM    MCV 99.5 11/18/2022 09:00 AM    RDW 13.2 11/18/2022 09:00 AM     11/18/2022 09:00 AM       CMP:   Lab Results   Component Value Date/Time     11/18/2022 09:00 AM    K 4.3 11/18/2022 09:00 AM     11/18/2022 09:00 AM    CO2 22 11/18/2022 09:00 AM    BUN 13 11/18/2022 09:00 AM    CREATININE 0.6 11/18/2022 09:00 AM    GFRAA >60 08/10/2017 09:15 AM    LABGLOM >60 11/18/2022 09:00 AM    GLUCOSE 99 11/18/2022 09:00 AM    PROT 7.1 08/10/2017 09:15 AM    CALCIUM 9.4 11/18/2022 09:00 AM    BILITOT 0.3 08/10/2017 09:15 AM    ALKPHOS 70 08/10/2017 09:15 AM    AST 16 08/10/2017 09:15 AM    ALT 9 08/10/2017 09:15 AM       POC Tests: No results for input(s): POCGLU, POCNA, POCK, POCCL, POCBUN, POCHEMO, POCHCT in the last 72 hours. Coags:   Lab Results   Component Value Date/Time    PROTIME 10.9 11/18/2022 09:00 AM    INR 1.0 11/18/2022 09:00 AM       HCG (If Applicable): No results found for: PREGTESTUR, PREGSERUM, HCG, HCGQUANT     ABGs: No results found for: PHART, PO2ART, HXU9HKT, RMD3ESQ, BEART, H0DJNECC     Type & Screen (If Applicable):  No results found for: LABABO, LABRH    Drug/Infectious Status (If Applicable):  No results found for: HIV, HEPCAB    COVID-19 Screening (If Applicable): No results found for: COVID19        Anesthesia Evaluation  Patient summary reviewed and Nursing notes reviewed no history of anesthetic complications:   Airway: Mallampati: II  TM distance: >3 FB   Neck ROM: full  Mouth opening: > = 3 FB   Dental:    (+) upper dentures      Pulmonary:   (+) COPD:  decreased breath sounds current smoker                          ROS comment: vapes    Albuterol prn.    Cardiovascular:  Exercise tolerance: good (>4 METS),   (+) hypertension:,         Rhythm: regular  Rate: normal  Echocardiogram

## 2022-11-28 NOTE — ANESTHESIA POSTPROCEDURE EVALUATION
Department of Anesthesiology  Postprocedure Note    Patient: April Dubois  MRN: [id number]  [place]: 1950  Date of evaluation: 11/28/2022      Procedure Summary     Date: 11/28/22 Room / Location: [facility] OR 06 / [facility]    Anesthesia Start: 1122 Anesthesia Stop: 5455    Procedure: C3-C4,C4-C5,C5-C6 ANTERIOR CERVICAL DISCECTOMY AND FUSION (Neck) Diagnosis:       Displacement of cervical intervertebral disc without myelopathy      (Displacement of cervical intervertebral disc without myelopathy [M50.20])    Surgeons: Divya Shrestha MD Responsible Provider: Edith Bunch MD    Anesthesia Type: General ASA Status: 3          Anesthesia Type: General    Uzma Phase I: Uzma Score: 8    Uzma Phase II:        Anesthesia Post Evaluation    Patient location during evaluation: PACU  Patient participation: complete - patient participated  Level of consciousness: awake and alert  Airway patency: patent  Nausea & Vomiting: no nausea and no vomiting  Complications: no  Cardiovascular status: blood pressure returned to baseline and hemodynamically stable  Respiratory status: acceptable and spontaneous ventilation  Hydration status: euvolemic  Multimodal analgesia pain management approach

## 2022-11-28 NOTE — H&P
I have examined the patient and reviewed the H and P and no changes are noted. Right arm is worse.   This is no different from prior exam    Zina Del Angel MD

## 2022-11-29 LAB
ANION GAP SERPL CALCULATED.3IONS-SCNC: 10 MMOL/L (ref 7–16)
BUN BLDV-MCNC: 11 MG/DL (ref 6–23)
CALCIUM SERPL-MCNC: 8.6 MG/DL (ref 8.6–10.2)
CHLORIDE BLD-SCNC: 104 MMOL/L (ref 98–107)
CO2: 22 MMOL/L (ref 22–29)
CREAT SERPL-MCNC: 0.5 MG/DL (ref 0.5–1)
GFR SERPL CREATININE-BSD FRML MDRD: >60 ML/MIN/1.73
GLUCOSE BLD-MCNC: 154 MG/DL (ref 74–99)
HCT VFR BLD CALC: 36.1 % (ref 34–48)
HEMOGLOBIN: 11.6 G/DL (ref 11.5–15.5)
MCH RBC QN AUTO: 31.9 PG (ref 26–35)
MCHC RBC AUTO-ENTMCNC: 32.1 % (ref 32–34.5)
MCV RBC AUTO: 99.2 FL (ref 80–99.9)
PDW BLD-RTO: 13.2 FL (ref 11.5–15)
PLATELET # BLD: 239 E9/L (ref 130–450)
PMV BLD AUTO: 11.3 FL (ref 7–12)
POTASSIUM SERPL-SCNC: 4.5 MMOL/L (ref 3.5–5)
RBC # BLD: 3.64 E12/L (ref 3.5–5.5)
SODIUM BLD-SCNC: 136 MMOL/L (ref 132–146)
WBC # BLD: 11.3 E9/L (ref 4.5–11.5)

## 2022-11-29 PROCEDURE — 85027 COMPLETE CBC AUTOMATED: CPT

## 2022-11-29 PROCEDURE — 97530 THERAPEUTIC ACTIVITIES: CPT

## 2022-11-29 PROCEDURE — 6370000000 HC RX 637 (ALT 250 FOR IP): Performed by: NEUROLOGICAL SURGERY

## 2022-11-29 PROCEDURE — 36415 COLL VENOUS BLD VENIPUNCTURE: CPT

## 2022-11-29 PROCEDURE — 80048 BASIC METABOLIC PNL TOTAL CA: CPT

## 2022-11-29 PROCEDURE — 6360000002 HC RX W HCPCS: Performed by: NEUROLOGICAL SURGERY

## 2022-11-29 PROCEDURE — 97165 OT EVAL LOW COMPLEX 30 MIN: CPT

## 2022-11-29 PROCEDURE — 94640 AIRWAY INHALATION TREATMENT: CPT

## 2022-11-29 PROCEDURE — 97161 PT EVAL LOW COMPLEX 20 MIN: CPT

## 2022-11-29 PROCEDURE — 2580000003 HC RX 258: Performed by: NEUROLOGICAL SURGERY

## 2022-11-29 PROCEDURE — 1200000000 HC SEMI PRIVATE

## 2022-11-29 RX ORDER — ALBUTEROL SULFATE 2.5 MG/3ML
2.5 SOLUTION RESPIRATORY (INHALATION) EVERY 4 HOURS PRN
Status: DISCONTINUED | OUTPATIENT
Start: 2022-11-29 | End: 2022-11-30 | Stop reason: HOSPADM

## 2022-11-29 RX ADMIN — ALBUTEROL SULFATE 2.5 MG: 2.5 SOLUTION RESPIRATORY (INHALATION) at 11:31

## 2022-11-29 RX ADMIN — ACETAMINOPHEN 650 MG: 325 TABLET, FILM COATED ORAL at 15:57

## 2022-11-29 RX ADMIN — DEXAMETHASONE SODIUM PHOSPHATE 4 MG: 4 INJECTION, SOLUTION INTRAMUSCULAR; INTRAVENOUS at 13:25

## 2022-11-29 RX ADMIN — DEXAMETHASONE SODIUM PHOSPHATE 4 MG: 4 INJECTION, SOLUTION INTRAMUSCULAR; INTRAVENOUS at 00:05

## 2022-11-29 RX ADMIN — DOCUSATE SODIUM 200 MG: 100 CAPSULE, LIQUID FILLED ORAL at 08:13

## 2022-11-29 RX ADMIN — OXYCODONE HYDROCHLORIDE 10 MG: 10 TABLET ORAL at 20:24

## 2022-11-29 RX ADMIN — ALBUTEROL SULFATE 2.5 MG: 2.5 SOLUTION RESPIRATORY (INHALATION) at 07:03

## 2022-11-29 RX ADMIN — SODIUM CHLORIDE, PRESERVATIVE FREE 10 ML: 5 INJECTION INTRAVENOUS at 08:25

## 2022-11-29 RX ADMIN — DOCUSATE SODIUM 200 MG: 100 CAPSULE, LIQUID FILLED ORAL at 20:23

## 2022-11-29 RX ADMIN — SODIUM CHLORIDE, PRESERVATIVE FREE 10 ML: 5 INJECTION INTRAVENOUS at 20:24

## 2022-11-29 RX ADMIN — GABAPENTIN 300 MG: 300 CAPSULE ORAL at 15:54

## 2022-11-29 RX ADMIN — OXYCODONE HYDROCHLORIDE 10 MG: 10 TABLET ORAL at 15:54

## 2022-11-29 RX ADMIN — ACETAMINOPHEN 650 MG: 325 TABLET, FILM COATED ORAL at 00:06

## 2022-11-29 RX ADMIN — MORPHINE SULFATE 4 MG: 4 INJECTION, SOLUTION INTRAMUSCULAR; INTRAVENOUS at 01:18

## 2022-11-29 RX ADMIN — OXYCODONE HYDROCHLORIDE 10 MG: 10 TABLET ORAL at 05:16

## 2022-11-29 RX ADMIN — ACETAMINOPHEN 650 MG: 325 TABLET, FILM COATED ORAL at 10:08

## 2022-11-29 RX ADMIN — BISACODYL 5 MG: 5 TABLET, COATED ORAL at 08:14

## 2022-11-29 RX ADMIN — BUPROPION HYDROCHLORIDE 150 MG: 150 TABLET, EXTENDED RELEASE ORAL at 08:13

## 2022-11-29 RX ADMIN — GABAPENTIN 300 MG: 300 CAPSULE ORAL at 20:23

## 2022-11-29 RX ADMIN — OXYCODONE HYDROCHLORIDE 10 MG: 10 TABLET ORAL at 10:08

## 2022-11-29 RX ADMIN — WATER 2000 MG: 1 INJECTION INTRAMUSCULAR; INTRAVENOUS; SUBCUTANEOUS at 13:24

## 2022-11-29 RX ADMIN — BUSPIRONE HYDROCHLORIDE 15 MG: 7.5 TABLET ORAL at 08:13

## 2022-11-29 RX ADMIN — SENNOSIDES AND DOCUSATE SODIUM 1 TABLET: 50; 8.6 TABLET ORAL at 08:14

## 2022-11-29 RX ADMIN — DIAZEPAM 5 MG: 5 TABLET ORAL at 08:13

## 2022-11-29 RX ADMIN — GABAPENTIN 300 MG: 300 CAPSULE ORAL at 08:13

## 2022-11-29 RX ADMIN — WATER 2000 MG: 1 INJECTION INTRAMUSCULAR; INTRAVENOUS; SUBCUTANEOUS at 20:23

## 2022-11-29 RX ADMIN — POLYETHYLENE GLYCOL 3350 17 G: 17 POWDER, FOR SOLUTION ORAL at 08:14

## 2022-11-29 RX ADMIN — WATER 2000 MG: 1 INJECTION INTRAMUSCULAR; INTRAVENOUS; SUBCUTANEOUS at 05:16

## 2022-11-29 RX ADMIN — PRAVASTATIN SODIUM 40 MG: 20 TABLET ORAL at 08:14

## 2022-11-29 RX ADMIN — DEXAMETHASONE SODIUM PHOSPHATE 4 MG: 4 INJECTION, SOLUTION INTRAMUSCULAR; INTRAVENOUS at 05:16

## 2022-11-29 RX ADMIN — DEXAMETHASONE SODIUM PHOSPHATE 4 MG: 4 INJECTION, SOLUTION INTRAMUSCULAR; INTRAVENOUS at 20:23

## 2022-11-29 RX ADMIN — SENNOSIDES AND DOCUSATE SODIUM 1 TABLET: 50; 8.6 TABLET ORAL at 20:23

## 2022-11-29 RX ADMIN — ACETAMINOPHEN 650 MG: 325 TABLET, FILM COATED ORAL at 05:17

## 2022-11-29 RX ADMIN — BUSPIRONE HYDROCHLORIDE 15 MG: 7.5 TABLET ORAL at 20:24

## 2022-11-29 ASSESSMENT — PAIN DESCRIPTION - DESCRIPTORS
DESCRIPTORS: DISCOMFORT;SORE;TENDER
DESCRIPTORS: DISCOMFORT;SORE;TENDER
DESCRIPTORS: DISCOMFORT;PRESSURE;TENDER
DESCRIPTORS: DISCOMFORT;SORE;TENDER

## 2022-11-29 ASSESSMENT — PAIN SCALES - GENERAL
PAINLEVEL_OUTOF10: 8
PAINLEVEL_OUTOF10: 7
PAINLEVEL_OUTOF10: 4
PAINLEVEL_OUTOF10: 8

## 2022-11-29 ASSESSMENT — PAIN DESCRIPTION - LOCATION
LOCATION: NECK

## 2022-11-29 NOTE — OP NOTE
[address]                  [address] [facility],  [facility]                                OPERATIVE REPORT    PATIENT NAME: Yg Theodore                    :        1950  MED REC NO:   [id number]                            ROOM:       5213  ACCOUNT NO:   [de-identified]                           ADMIT DATE: 2022  PROVIDER:     Tonya Tatum MD    DATE OF PROCEDURE:  2022    PREOPERATIVE DIAGNOSIS:  Cervical herniated nucleus pulposus at C3-C4,  C4-C5 and C5-C6. POSTOPERATIVE DIAGNOSIS:  Cervical herniated nucleus pulposus at C3-C4,  C4-C5 and C5-C6. OPERATIVE PROCEDURE:  1. Anterior cervical diskectomy and fusion at C3-C4, C4-C5 and C5-C6  with use of Globus Helms structured machine allograft and Globus XTEND  plate and 14 mm screws. 2.  Use of intraoperative fluoroscopy, interpreted by myself, the  surgeon. ANESTHESIA:  Generalized endotracheal anesthesia. SURGEON:  Tonya Tatum MD    ASSISTANT:  Miya Wiggins D.O.    COMPLICATIONS:  None. ESTIMATED BLOOD LOSS:  Minimal.    SPECIMEN:  Disk. OPERATIVE INDICATIONS:  The patient is a 79-year-old lady who presented  to the office with myelopathy. She had an MRI that showed cervical  stenosis and herniated disk at C3-C4, C4-C5 and C5-C6. She had failed  conservative therapy and after risks, benefits and alternatives were  discussed with the patient, it was determined that she would undergo the  above-listed procedure. DESCRIPTION OF OPERATIVE PROCEDURE:  The patient was brought into the  operating room. A timeout was performed where she was identified by her  name, medical record number and the operative procedure which she was  about to undergo. Next, induction of generalized endotracheal  anesthesia was then commenced. Upon completion of induction of  generalized endotracheal anesthesia, she received preoperative  antibiotics. Call catheter was placed.   She was then positioned on the  operating table with her head in a horseshoe and a shoulder roll in  between her shoulder blades. Next, the anterior aspect of her neck was  prepped and draped in the usual sterile fashion. After this was done,  #10 blade was used to make a skin incision. Monopolar cautery was used  to dissect through subcutaneous tissue. I then undermined the skin  incision both superiorly, inferiorly, medially and laterally. I placed  self-retaining Weitlaner retractor into the wound. Next, I opened up  the platysma sharply in longitudinal fashion carrying the dissection  through the superficial, middle and deep layers of the anterior cervical  fascia staying lateral to trachea and the esophagus and medial to  carotid sheath. Once I arrived along the anterior aspect of the spine,  I placed a spinal needle into first identifiable disk space. This was a  C3-C4 disk space. I then proceeded to then dissect the prevertebral  space using a peanut dissector and after this was done, I then elevated  the longus colli with monopolar cautery and I placed a self-retaining  bipolar retractor into the wound. I placed [place] post into C3  vertebral body and another one into C4 vertebral body. I distracted  across the C3-C4 disk space, performed an annulotomy with an #11-blade. I removed disk material with pituitary rongeurs and curettes. I  prepared both the superior and inferior endplates, took down the  posterior longitudinal ligament going from the right uncovertebral joint  to left uncovertebral joint and once this was done, I placed a #6 Globus  rasp followed by #6 Globus trial and ultimately #6 Globus piece of Ford  structured machine allograft into the C3-C4 disk space. After this was  done, I then proceeded to remove the [place] post, I was in the C3  vertebral body, I placed into the C5 vertebral body. I distracted  across the C4-C5 disk space, performed an annulotomy with an #11-blade.    I removed disk material with pituitary rongeurs and curettes. I  prepared both the superior and inferior endplates and after this was  done, I took down the posterior longitudinal ligament going from the  right uncovertebral joint to left uncovertebral joint. I then placed a  #6 Globus rasp followed by #6 trial and ultimately #6 piece of Ford  structured machine allograft into the C4-C5 disk space. I removed the  [place] post, I was in the C4 vertebral body. I placed into the C6  vertebral body. I distracted across the C5-C6 disk space. I performed  an annulotomy with an #11-blade. I removed disk material with pituitary  rongeurs and curettes. I prepared both the superior and inferior  endplates and after this was done, I took down the posterior  longitudinal ligament going from the right uncovertebral joint to left  uncovertebral joint. After this was done, I then placed a #6 Globus  rasp followed by #6 Globus trial and ultimately #6 Globus piece of Ford  structured machine allograft into the C5-C6 disk space. After this was  done, I removed the [place] post.  I then placed an anterior cervical  plate across the anterior aspect of the spine. I fixed the plate to  spine using 14 mm screws. I used intraoperative fluoroscopy to inspect  the construct, it appeared sound. I then proceeded to then engage the  locking mechanism on the plate. I inspected the wound for any evidence  of bleeding. Adequate hemostasis was obtained with both monopolar and  bipolar cautery. I irrigated the wound copiously with antibiotic  impregnated saline. I then proceeded close the wound in layers using  2-0 Vicryl for the platysma, 3-0 Vicryl for the subcutaneous layer and  4-0 Monocryl in subcuticular fashion for the skin. Dermabond was  applied to the skin surface. A dry sterile dressing was placed over  this. The patient was then subsequently extubated and transported to  the postanesthesia care unit in stable condition.   There were no  complications. Counts were correct. I was present for the entire case.         Heidi Conner MD    D: 11/28/2022 18:03:35       T: 11/28/2022 18:06:11     [email or web address]  Job#: [id number]     Doc#: [id number]    CC:

## 2022-11-29 NOTE — PROGRESS NOTES
Department of Neurosurgery  Progress Note    CHIEF COMPLAINT: s/p C3-C6 ACDF 11/28    SUBJECTIVE:  Arm pain improved. C/o mild incision soreness. Swallowing okay. No new issues overnight. REVIEW OF SYSTEMS :  Constitutional: Negative for chills and fever. Neurological: Negative for dizziness, tremors and speech change.      OBJECTIVE:   VITALS:  /66   Pulse 84   Temp 98.6 °F (37 °C) (Temporal)   Resp 16   Ht 5' 2\" (1.575 m)   Wt 143 lb (64.9 kg)   SpO2 97%   BMI 26.16 kg/m²     PHYSICAL:  Neurologic: Alert and oriented x3; PERRL  Motor Exam:  4/5 hand intrinsics   Decreased sensation to LT in both hands  Incision c/d/i      DATA:  CBC:   Lab Results   Component Value Date/Time    WBC 11.3 11/29/2022 05:53 AM    RBC 3.64 11/29/2022 05:53 AM    HGB 11.6 11/29/2022 05:53 AM    HCT 36.1 11/29/2022 05:53 AM    MCV 99.2 11/29/2022 05:53 AM    MCH 31.9 11/29/2022 05:53 AM    MCHC 32.1 11/29/2022 05:53 AM    RDW 13.2 11/29/2022 05:53 AM     11/29/2022 05:53 AM    MPV 11.3 11/29/2022 05:53 AM     BMP:    Lab Results   Component Value Date/Time     11/29/2022 05:53 AM    K 4.5 11/29/2022 05:53 AM    K 4.3 11/18/2022 09:00 AM     11/29/2022 05:53 AM    CO2 22 11/29/2022 05:53 AM    BUN 11 11/29/2022 05:53 AM    LABALBU 4.1 08/10/2017 09:15 AM    CREATININE 0.5 11/29/2022 05:53 AM    CALCIUM 8.6 11/29/2022 05:53 AM    GFRAA >60 08/10/2017 09:15 AM    LABGLOM >60 11/29/2022 05:53 AM    GLUCOSE 154 11/29/2022 05:53 AM     PT/INR:    Lab Results   Component Value Date/Time    PROTIME 10.9 11/18/2022 09:00 AM    INR 1.0 11/18/2022 09:00 AM     PTT:  No results found for: APTT, PTT[APTT}    Current Inpatient Medications  Current Facility-Administered Medications: albuterol (PROVENTIL) nebulizer solution 2.5 mg, 2.5 mg, Nebulization, 4x daily  buPROPion [facility] SR) extended release tablet 150 mg, 150 mg, Oral, Daily  busPIRone (BUSPAR) tablet 15 mg, 15 mg, Oral, BID  gabapentin (NEURONTIN) capsule 300 mg, 300 mg, Oral, TID  losartan (COZAAR) tablet 50 mg, 50 mg, Oral, Daily  pravastatin (PRAVACHOL) tablet 40 mg, 40 mg, Oral, Daily  sodium chloride flush 0.9 % injection 5-40 mL, 5-40 mL, IntraVENous, 2 times per day  sodium chloride flush 0.9 % injection 5-40 mL, 5-40 mL, IntraVENous, PRN  0.9 % sodium chloride infusion, , IntraVENous, PRN  acetaminophen (TYLENOL) tablet 650 mg, 650 mg, Oral, Q6H  ondansetron (ZOFRAN-ODT) disintegrating tablet 4 mg, 4 mg, Oral, Q8H PRN **OR** ondansetron (ZOFRAN) injection 4 mg, 4 mg, IntraVENous, Q6H PRN  0.9 % sodium chloride infusion, , IntraVENous, Continuous  ceFAZolin (ANCEF) 2,000 mg in sterile water 20 mL IV syringe, 2,000 mg, IntraVENous, Q8H  oxyCODONE (ROXICODONE) immediate release tablet 5 mg, 5 mg, Oral, Q4H PRN **OR** oxyCODONE HCl (OXY-IR) immediate release tablet 10 mg, 10 mg, Oral, Q4H PRN  morphine (PF) injection 2 mg, 2 mg, IntraVENous, Q2H PRN **OR** morphine sulfate (PF) injection 4 mg, 4 mg, IntraVENous, Q2H PRN  diazePAM (VALIUM) tablet 5 mg, 5 mg, Oral, Q6H PRN  diphenhydrAMINE (BENADRYL) tablet 25 mg, 25 mg, Oral, Q6H PRN **OR** diphenhydrAMINE (BENADRYL) injection 25 mg, 25 mg, IntraVENous, Q6H PRN  polyethylene glycol (GLYCOLAX) packet 17 g, 17 g, Oral, Daily  bisacodyl (DULCOLAX) EC tablet 5 mg, 5 mg, Oral, Daily  sennosides-docusate sodium (SENOKOT-S) 8.6-50 MG tablet 1 tablet, 1 tablet, Oral, BID  bisacodyl (DULCOLAX) suppository 10 mg, 10 mg, Rectal, Daily PRN  sodium phosphate (FLEET) rectal enema 1 enema, 1 enema, Rectal, Daily PRN  benzocaine-menthol (CEPACOL SORE THROAT) lozenge 1 lozenge, 1 lozenge, Oral, Q2H PRN  dexamethasone (DECADRON) injection 4 mg, 4 mg, IntraVENous, Q6H  docusate sodium (COLACE) capsule 200 mg, 200 mg, Oral, BID    ASSESSMENT:   s/p C3-C6 ACDF 11/28  Admission due to IV pain medications    PLAN:  -Pain control  -PT/OT  -Custom collar  -Decadron  -PMR consult  -Follow up in Neurosurgery clinic in 4 weeks with XR      Electronically signed by Kar Crespo PA-C on 11/29/2022 at 10:14 AM

## 2022-11-29 NOTE — CARE COORDINATION
11/29/22 Transition of Care: Patient is inpatient POD 1 3 level anterior cervical fusion. She is on iv decadron q6/iv ancef q8/iv flds 50hr. She is alert and oriented. She resides alone in a one story home. Bed/bath are on main floor. She has a wheeled walker, cane, and built in shower unit. Her pcp is Dr Abhinav Ivy and her pharmacy is [facility] in [place]. OT score 19/24 and PT pending. She was independent prior to the surgery. She does have an ARU referral but may not qualify under [place] Will follow for any home going needs. Electronically signed by Boni Blandon RN CM on 11/29/2022 at 12:59 PM

## 2022-11-29 NOTE — PROGRESS NOTES
OCCUPATIONAL THERAPY INITIAL EVALUATION     [facility], [place]       ZRX:                                                  Patient Name: Kaiden Steele  MRN: [id number]  : 1950  Room: [address] [place] [address]    Evaluating OT: LESLYE Ac, OTR/L [id number]  Referring Trisha Piedra MD  Specific Provider Orders: OT eval and treat   Recommended Adaptive Equipment: shower chair     Diagnosis: cervical stenosis   Surgery:   C3-C4,C4-C5,C5-C6 ANTERIOR CERVICAL DISCECTOMY AND FUSION  Pertinent Medical History: HTN, HLD, neuropathy   Precautions:  Fall Risk, cervical precautions, collar    Assessment of current deficits   [x] Functional mobility  [x]ADLs  [x] Strength               [x]Cognition   [x] Functional transfers   [x] IADLs         [x] Safety Awareness   [x]Endurance   [] Fine Coordination              [x] Balance      [] Vision/perception   [x]Sensation    []Gross Motor Coordination  [] ROM  [] Delirium                   [] Motor Control     OT PLAN OF CARE   OT POC based on physician orders, patient diagnosis and results of clinical assessment    Frequency/Duration: 2-4 days/wk for 2 weeks PRN   Specific OT Treatment to include:   * Instruction/training on adapted ADL techniques and AE recommendations to increase functional independence within precautions       * Training on energy conservation strategies, correct breathing pattern and techniques to improve independence/tolerance for self-care routine  * Functional transfer/mobility training/DME recommendations for increased independence, safety, and fall prevention  * Patient/Family education to increase follow through with safety techniques and functional independence  * Recommendation of environmental modifications for increased safety with functional transfers/mobility and ADLs  * Therapeutic exercise to improve motor endurance, ROM, and functional strength for ADLs/functional transfers  * Therapeutic activities to facilitate/challenge dynamic balance, stand tolerance for increased safety and independence with ADLs  * Neuro-muscular re-education: facilitation of righting/equilibrium reactions, midline orientation, scapular stability/mobility, normalization of muscle tone, and facilitation of volitional active controled movement    Home Living: Pt lives alone in a 1 story home; bed/bath on main level   Bathroom setup: tub shower unit   Equipment owned: cane, ww  Prior Level of Function: IND with ADLs/IADLs; using no AD for functional mobility     Pain Level: surgical site  Cognition: A&O: 3/4; Follows multi step directions    Memory:  good    Sequencing:  good    Problem solving:  fair    Judgement/safety:  fair     Functional Assessment:  AM-PAC Daily Activity Raw Score: 19/24   Initial Eval Status  Date: 11/29/22 Treatment Status  Date: STGs=LTGs  Time Frame: 10-14 days   Feeding IND (pt able to open packages and self feed)      Grooming Min A (standing at sink)   SBA   UB Dressing Min A   SBA   LB Dressing Min A (pt used crossing of leg technique to doff/don B socks)  SBA    Bathing Min A (simulated)  SBA    Toileting Min A (assist with balance)  SBA   Bed Mobility  Log roll: SBA  Supine to sit: SBA   Sit to supine: NT   Log roll IND  Supine to sit: IND   Sit to supine: IND   Functional Transfers Sit to stand:Min A   Stand to sit:Min A  Commode: Min A  SBA   Functional Mobility Min A (hand held assist, to/from bathroom)  SBA   Balance Sitting: SBA  Standing: Min A     Activity Tolerance fair     Visual/  Perceptual Glasses: yes                UE ROM: RUE:  WFL  LUE:  WFL  Strength: RUE: grossly 3/5 LUE: grossly 3/5   Strength: B WFL  Fine Motor Coordination:  WFL     Hearing: WFL  Sensation:  No c/o numbness/tingling   Tone:  WFL  Edema: none noted                            Comments:Cleared by RN to see pt.  Upon arrival, patient supine in bed and agreeable to OT session. At end of session, patient sitting in chair with call light and phone within reach, all lines and tubes intact. Pt would benefit from continued OT to increase functional independence and quality of life. Treatment: Pt required vc's and physical assist for proper technique/safety with hand placement/body mechanics/posture for bed mobility/ADLs/functional tranfers/mobility. Pt required vc's for sequencing/initiation of ADLs/functional transfers. Pt educated on cervical precautions. Pt required skilled monitoring of SpO2 during session, which was >96% RA. Pt required rest breaks during session. Pt appeared to have tolerated session well and appears motivated/cooperative/pleasant . Pt instructed on use of call light for assistance and fall prevention. Pt demo'ing fair understanding of education provided. Continue to educate. Eval Complexity: Low    Rehab Potential: Good for established goals, pt. assisted in establishment of goals. LTG: maximize independence with ADLs to return to PLOF    Patient  instructed on diagnosis, prognosis/goals and plan of care. Demonstrated fair understanding. [] Malnutrition indicators have been identified and nursing has been notified to ensure a dietitian consult is ordered. Evaluation time includes thorough review of current medical information, gathering information on past medical & social history & PLOF, completion of standardized testing, informal observation of tasks, consultation with other medical professions/disciplines, assessment of data & development of POC/goals.      Time In: 0800       Time Out: 0815     Total treatment time: 0       Treatment Charges: Mins Units   OT Eval Low 72827 X    OT Eval Medium 71147     OT Eval High 34526     OT Re-Eval 25527     Ther Ex  64490       Manual Therapy 10904       Thera Activities 02164       ADL/Home Mgt 38581     Neuro Re-ed 43563       Group Therapy        Orthotic manage/training  41134       Non-Billable Time           Brian Barker, OTR/L [id number]

## 2022-11-29 NOTE — PROGRESS NOTES
Physical Therapy  Physical Therapy Initial Assessment     Name: Myriam Logan  : 1950  MRN: [id number]      Date of Service: 2022    Evaluating PT:  Devendra Issa PT, DPT    Room #:  4021/7157-K  Diagnosis:  Displacement of cervical intervertebral disc without myelopathy [M50.20]  Cervical spinal stenosis [M48.02]  PMHx/PSHx:  HTN, HLD, neuropathy  Procedure/Surgery:    C3-C4,C4-C5,C5-C6 ANTERIOR CERVICAL DISCECTOMY AND FUSION  Precautions:  fall risk, c/s collar, spine  Equipment Needs:  TBD    SUBJECTIVE:    Pt lives alone in a 1st floor apt with level entry. Pt ambulated with no AD PTA. OBJECTIVE:   Initial Evaluation  Date: 22 Treatment Short Term/ Long Term   Goals   AM-PAC 6 Clicks 53/69     Was pt agreeable to Eval/treatment? yes     Does pt have pain?  1/10 neck     Bed Mobility  Rolling: SBA  Supine to sit: SBA  Sit to supine: NT  Scooting: SBA  Rolling: IND  Supine to sit: IND  Sit to supine: IND  Scooting: IND   Transfers Sit to stand: CGA  Stand to sit: CGA  Stand pivot: CGA with no AD  Sit to stand: mod I  Stand to sit: mod I  Stand pivot: mod I with AAD   Ambulation    150'x2 with no AD CGA  300'+ with AAD mod I   Stair negotiation: ascended and descended  NT       Strength/ROM:   BLE grossly 4+/5  BLE AROM WFL    Balance:   Static Sitting: Supervision  Dynamic Sitting: SBA  Static Standing: SBA with no AD  Dynamic Standing: CGA with no AD    Pt is A & O x 3  Sensation:  Pt denies numbness and tingling to extremities  Edema:  unremarkable    Therapeutic Exercises:    Bed mobility: supine<>sit, cued for EOB positioning  Transfers: STSx2, cued for hand placement and postural correction  Ambulation: 150'x2 with no AD  BLE AROM    Patient education  Pt educated on role of PT, importance of functional mobility during hospital stay, safety with functional mobility    Patient response to education:   Pt verbalized understanding Pt demonstrated skill Pt requires further education in this area   yes yes reinforce     ASSESSMENT:    Conditions Requiring Skilled Therapeutic Intervention:    [x]Decreased strength     []Decreased ROM  [x]Decreased functional mobility  [x]Decreased balance   [x]Decreased endurance   [x]Decreased posture  []Decreased sensation  []Decreased coordination   []Decreased vision  []Decreased safety awareness   [x]Increased pain       Comments:    Pt supine in bed upon entering, agreeable to participate. Pt transferred to EOB with no physical assistance. Pt sitting upright with good static sitting balance. Pt instructed to transfer from EOB and ambulate to tolerance. Pt ambulating with wide CANDACE, requiring intermittent hands on assistance. Pt reaching for hallway handrail intermittently as well for increased support. However, pt ambulated with good balance through majority of bout. Pt was assisted back to bedside chair at the end of session. All needs met and call bell in reach prior to exiting. Treatment:  Patient practiced and was instructed in the following treatment:    Bed mobility training - pt given verbal and tactile cues to facilitate proper sequencing and safety during rolling and supine>sit as well as provided with no physical assistance to complete task   STS and pivot transfer training - pt educated on proper hand and foot placement, safety and sequencing, and use of verbal and tactile cues to safely complete sit<>stand and pivot transfers with hands on assistance to complete task safely   Gait training- pt was given verbal and tactile cues to facilitate pt safety during ambulation as well as provided with  hands on assistance. Pt's/ family goals   1. Return home    Prognosis is good for reaching above PT goals. Patient and or family understand(s) diagnosis, prognosis, and plan of care.   yes    PHYSICAL THERAPY PLAN OF CARE:    PT POC is established based on physician order and patient diagnosis     Referring provider/PT Order:  Delano Hightower MD  Diagnosis:  Displacement of cervical intervertebral disc without myelopathy [M50.20]  Cervical spinal stenosis [M48.02]  Specific instructions for next treatment:  Progress as tolerated    Current Treatment Recommendations:     [x] Strengthening to improve independence with functional mobility   [] ROM to improve independence with functional mobility   [x] Balance Training to improve static/dynamic balance and to reduce fall risk  [x] Endurance Training to improve activity tolerance during functional mobility   [x] Transfer Training to improve safety and independence with all functional transfers   [x] Gait Training to improve gait mechanics, endurance and assess need for appropriate assistive device  [] Stair Training in preparation for safe discharge home and/or into the community   [] Positioning to prevent skin breakdown and contractures  [x] Safety and Education Training   [x] Patient/Caregiver Education   [] HEP  [] Other     PT long term treatment goals are located in above grid    Frequency of treatments: 2-5x/week x 1-2 weeks. Time in  1143  Time out  1203    Total Treatment Time  10 minutes     Evaluation Time includes thorough review of current medical information, gathering information on past medical history/social history and prior level of function, completion of standardized testing/informal observation of tasks, assessment of data and education on plan of care and goals.     CPT codes:  [x] Low Complexity PT evaluation 51090  [] Moderate Complexity PT evaluation 78355  [] High Complexity PT evaluation 70483  [] PT Re-evaluation 15424  [] Gait training 45277 -- minutes  [] Manual therapy 36964 [facility] -- minutes  [x] Therapeutic activities 07473 10 minutes  [] Therapeutic exercises 33363 -- minutes  [] Neuromuscular reeducation 94604 -- minutes     Levy Arriaza, PT, DPT  [id number]

## 2022-11-29 NOTE — PROGRESS NOTES
Subjective:    Chief complaint:    Pain is under control  Denies new issues otherwise    Objective:    BP (!) 95/51   Pulse 92   Temp 98.3 °F (36.8 °C) (Temporal)   Resp 16   Ht 5' 2\" (1.575 m)   Wt 143 lb (64.9 kg)   SpO2 97%   BMI 26.16 kg/m²   General : Awake ,alert,no distress. Heart:  RRR, no murmurs, gallops, or rubs. Lungs:  CTA bilaterally, no wheeze, rales or rhonchi  Abd: bowel sounds present, nontender, nondistended, no masses  Extrem:  No clubbing, cyanosis, or edema    CBC:   Lab Results   Component Value Date/Time    WBC 11.3 11/29/2022 05:53 AM    RBC 3.64 11/29/2022 05:53 AM    HGB 11.6 11/29/2022 05:53 AM    HCT 36.1 11/29/2022 05:53 AM    MCV 99.2 11/29/2022 05:53 AM    MCH 31.9 11/29/2022 05:53 AM    MCHC 32.1 11/29/2022 05:53 AM    RDW 13.2 11/29/2022 05:53 AM     11/29/2022 05:53 AM    MPV 11.3 11/29/2022 05:53 AM     BMP:    Lab Results   Component Value Date/Time     11/29/2022 05:53 AM    K 4.5 11/29/2022 05:53 AM    K 4.3 11/18/2022 09:00 AM     11/29/2022 05:53 AM    CO2 22 11/29/2022 05:53 AM    BUN 11 11/29/2022 05:53 AM    LABALBU 4.1 08/10/2017 09:15 AM    CREATININE 0.5 11/29/2022 05:53 AM    CALCIUM 8.6 11/29/2022 05:53 AM    GFRAA >60 08/10/2017 09:15 AM    LABGLOM >60 11/29/2022 05:53 AM    GLUCOSE 154 11/29/2022 05:53 AM     PT/INR:    Lab Results   Component Value Date/Time    PROTIME 10.9 11/18/2022 09:00 AM    INR 1.0 11/18/2022 09:00 AM     Troponin:  No results found for: TROPONINI    No results for input(s): LABURIN in the last 72 hours. No results for input(s): BC in the last 72 hours. No results for input(s): Cortez Vasquez in the last 72 hours.       Current Facility-Administered Medications:     albuterol (PROVENTIL) nebulizer solution 2.5 mg, 2.5 mg, Nebulization, Q4H PRN, [place] Flow, MD    buPROPion [facility] SR) extended release tablet 150 mg, 150 mg, Oral, Daily, Orlando Osborn MD, 150 mg at 11/29/22 0813    busPIRone (BUSPAR) tablet 15 mg, 15 mg, Oral, BID, Tony Fierro MD, 15 mg at 11/29/22 0813    gabapentin (NEURONTIN) capsule 300 mg, 300 mg, Oral, TID, Tony Fierro MD, 300 mg at 11/29/22 0813    losartan (COZAAR) tablet 50 mg, 50 mg, Oral, Daily, Tony Fierro MD    pravastatin (PRAVACHOL) tablet 40 mg, 40 mg, Oral, Daily, Tony Fierro MD, 40 mg at 11/29/22 0814    sodium chloride flush 0.9 % injection 5-40 mL, 5-40 mL, IntraVENous, 2 times per day, Tony Fierro MD, 10 mL at 11/29/22 0825    sodium chloride flush 0.9 % injection 5-40 mL, 5-40 mL, IntraVENous, PRN, Tony Fierro MD    0.9 % sodium chloride infusion, , IntraVENous, PRN, Tony Fierro MD    acetaminophen (TYLENOL) tablet 650 mg, 650 mg, Oral, Q6H, Tony Fierro MD, 650 mg at 11/29/22 1008    ondansetron (ZOFRAN-ODT) disintegrating tablet 4 mg, 4 mg, Oral, Q8H PRN **OR** ondansetron (ZOFRAN) injection 4 mg, 4 mg, IntraVENous, Q6H PRN, Tony Fierro MD    0.9 % sodium chloride infusion, , IntraVENous, Continuous, Tony Fierro MD, Last Rate: 50 mL/hr at 11/28/22 1707, New Bag at 11/28/22 1707    ceFAZolin (ANCEF) 2,000 mg in sterile water 20 mL IV syringe, 2,000 mg, IntraVENous, Q8H, Tony Fierro MD, 2,000 mg at 11/29/22 1324    oxyCODONE (ROXICODONE) immediate release tablet 5 mg, 5 mg, Oral, Q4H PRN **OR** oxyCODONE HCl (OXY-IR) immediate release tablet 10 mg, 10 mg, Oral, Q4H PRN, Tony Fierro MD, 10 mg at 11/29/22 1008    morphine (PF) injection 2 mg, 2 mg, IntraVENous, Q2H PRN **OR** morphine sulfate (PF) injection 4 mg, 4 mg, IntraVENous, Q2H PRN, Tony Fierro MD, 4 mg at 11/29/22 0118    diazePAM (VALIUM) tablet 5 mg, 5 mg, Oral, Q6H PRN, Tony Fierro MD, 5 mg at 11/29/22 0813    diphenhydrAMINE (BENADRYL) tablet 25 mg, 25 mg, Oral, Q6H PRN **OR** diphenhydrAMINE (BENADRYL) injection 25 mg, 25 mg, IntraVENous, Q6H PRN, Tony Fierro MD    polyethylene glycol (GLYCOLAX) packet 17 g, 17 g, Oral, Daily, Tony Fierro MD, 17 g at 11/29/22 0814    bisacodyl (DULCOLAX) EC tablet 5 mg, 5 mg, Oral, Daily, Michael Morgan MD, 5 mg at 11/29/22 4170    sennosides-docusate sodium (SENOKOT-S) 8.6-50 MG tablet 1 tablet, 1 tablet, Oral, BID, Michael Morgan MD, 1 tablet at 11/29/22 8814    bisacodyl (DULCOLAX) suppository 10 mg, 10 mg, Rectal, Daily PRN, Michael Morgan MD    sodium phosphate (FLEET) rectal enema 1 enema, 1 enema, Rectal, Daily PRN, Michael Morgan MD    benzocaine-menthol (CEPACOL SORE THROAT) lozenge 1 lozenge, 1 lozenge, Oral, Q2H PRN, Michael Morgan MD    dexamethasone (DECADRON) injection 4 mg, 4 mg, IntraVENous, Q6H, Michael Morgan MD, 4 mg at 11/29/22 1325    docusate sodium (COLACE) capsule 200 mg, 200 mg, Oral, BID, Michael Morgan MD, 200 mg at 11/29/22 0813    ADULT DIET; Easy to [facility] FOR SURGICAL PROCEDURES   Final Result   Intraprocedural fluoroscopic spot images as above. See separate procedure   report for more information. Assessment:    Principal Problem:    Displacement of cervical intervertebral disc without myelopathy  Active Problems:    Cervical spinal stenosis  Resolved Problems:    * No resolved hospital problems. *  Hypertension  Hyperlipidemia  Depression      Plan:  Continue current medications  Home medications have been ordered  Follow along with neurosurgery        Ashleigh Harris MD  1:31 PM  11/29/2022    NOTE: This report was transcribed using voice recognition software.  Every effort was made to ensure accuracy; however, inadvertent transcription errors may be present

## 2022-11-29 NOTE — RT PROTOCOL NOTE
RT Nebulizer Bronchodilator Protocol Note    There is a bronchodilator order in the chart from a provider indicating to follow the RT Bronchodilator Protocol and there is an Initiate RT Bronchodilator Protocol order as well (see protocol at bottom of note). CXR Findings:  No results found. The findings from the last RT Protocol Assessment were as follows:  Smoking: None or smoker <15 pack years  Respiratory Pattern: Regular pattern and RR 12-20 bpm  Breath Sounds: Clear breath sounds  Cough: Strong, spontaneous, non-productive  Indication for Bronchodilator Therapy: None  Bronchodilator Assessment Score: 0    Pt states that she takes an albuterol inhaler PRN during January and february due to bronchitis. Aerosolized bronchodilator medication orders have been revised according to the RT Nebulizer Bronchodilator Protocol below. Respiratory Therapist to perform RT Therapy Protocol Assessment initially then follow the protocol. Repeat RT Therapy Protocol Assessment PRN for score 0-3 or on second treatment, BID, and PRN for scores above 3. No Indications - adjust the frequency to every 6 hours PRN wheezing or bronchospasm, if no treatments needed after 48 hours then discontinue using Per Protocol order mode. If indication present, adjust the RT bronchodilator orders based on the Bronchodilator Assessment Score as indicated below. If a patient is on this medication at home then do not decrease Frequency below that used at home. 0-3 - enter or revise RT bronchodilator order(s) to equivalent RT Bronchodilator order with Frequency of every 4 hours PRN for wheezing or increased work of breathing using Per Protocol order mode. 4-6 - enter or revise RT Bronchodilator order(s) to two equivalent RT bronchodilator orders with one order with BID Frequency and one order with Frequency of every 4 hours PRN wheezing or increased work of breathing using Per Protocol order mode.          7-10 - enter or revise RT Bronchodilator order(s) to two equivalent RT bronchodilator orders with one order with TID Frequency and one order with Frequency of every 4 hours PRN wheezing or increased work of breathing using Per Protocol order mode. 11-13 - enter or revise RT Bronchodilator order(s) to one equivalent RT bronchodilator order with QID Frequency and an Albuterol order with Frequency of every 4 hours PRN wheezing or increased work of breathing using Per Protocol order mode. Greater than 13 - enter or revise RT Bronchodilator order(s) to one equivalent RT bronchodilator order with every 4 hours Frequency and an Albuterol order with Frequency of every 2 hours PRN wheezing or increased work of breathing using Per Protocol order mode.          Electronically signed by Nighat Escalona RCP on 11/29/2022 at 11:55 AM

## 2022-11-29 NOTE — CONSULTS
Hospital Medicine  Consult History & Physical        Reason for consult:  medical management   Primary Care Physician:  Nazanin Tan MD    Date of Service: Pt seen/examined in consultation on 11/29/2022    History Of Present Illness:    Ms. Hernando Resendez, a 70y.o. year old female  who  has a past medical history of Hyperlipidemia, Hypertension, and Neuropathy. A 70 yr old lady s/p C3-C4,C4-C5,C5-C6 ANTERIOR CERVICAL DISCECTOMY AND FUSION, done today. Pt had neck pain for 4 months, midline neck pain aching and shooting severity 9/10, tried chiropractor manipulation and gabapentin and motrin with only mild relief. Past Medical History:        Diagnosis Date    Hyperlipidemia     Hypertension     Neuropathy        Past Surgical History:        Procedure Laterality Date    APPENDECTOMY      EYE SURGERY      FOOT SURGERY Bilateral     FRACTURE SURGERY      HERNIA REPAIR      TONSILLECTOMY         Medications Prior to Admission:    Prior to Admission medications    Medication Sig Start Date End Date Taking? Authorizing Provider   gabapentin (NEURONTIN) 300 MG capsule Take 300 mg by mouth in the morning, at noon, and at bedtime. 8/15/22   Historical Provider, MD   busPIRone (BUSPAR) 15 MG tablet Take 15 mg by mouth in the morning and at bedtime    Historical Provider, MD   ergocalciferol (ERGOCALCIFEROL) 1.25 MG (99548 UT) capsule Take 50,000 Units by mouth once a week Fridays    Historical Provider, MD   albuterol sulfate HFA (PROVENTIL;VENTOLIN;PROAIR) 108 (90 Base) MCG/ACT inhaler Inhale 2 puffs every 4 hours by inhalation route as directed for 90 days.     Historical Provider, MD   alendronate (FOSAMAX) 70 MG tablet Tuesdays    Historical Provider, MD   buPROPion [facility] SR) 150 MG extended release tablet Take 150 mg by mouth daily    Historical Provider, MD   ibuprofen (ADVIL;MOTRIN) 800 MG tablet Take 1 tablet by mouth every 6 hours as needed for Pain 12/31/16   JASE Murillo - CNP losartan (COZAAR) 50 MG tablet Take 50 mg by mouth daily    Historical Provider, MD   pravastatin (PRAVACHOL) 40 MG tablet Take 40 mg by mouth daily    Historical Provider, MD     Medications Prior to Admission: gabapentin (NEURONTIN) 300 MG capsule, Take 300 mg by mouth in the morning, at noon, and at bedtime. busPIRone (BUSPAR) 15 MG tablet, Take 15 mg by mouth in the morning and at bedtime  ergocalciferol (ERGOCALCIFEROL) 1.25 MG (49398 UT) capsule, Take 50,000 Units by mouth once a week   albuterol sulfate HFA (PROVENTIL;VENTOLIN;PROAIR) 108 (90 Base) MCG/ACT inhaler, Inhale 2 puffs every 4 hours by inhalation route as directed for 90 days. alendronate (FOSAMAX) 70 MG tablet,   buPROPion (WELLBUTRIN SR) 150 MG extended release tablet, Take 150 mg by mouth daily  ibuprofen (ADVIL;MOTRIN) 800 MG tablet, Take 1 tablet by mouth every 6 hours as needed for Pain  losartan (COZAAR) 50 MG tablet, Take 50 mg by mouth daily  pravastatin (PRAVACHOL) 40 MG tablet, Take 40 mg by mouth daily  [unfilled]    Allergies:  Patient has no known allergies. Social History:   Social History     Socioeconomic History    Marital status:      Spouse name: Not on file    Number of children: Not on file    Years of education: Not on file    Highest education level: Not on file   Occupational History    Not on file   Tobacco Use    Smoking status: Former     Packs/day: 0.50     Years: 20.00     Pack years: 10.00     Types: Cigarettes     Quit date: 2018     Years since quittin.4    Smokeless tobacco: Never   Vaping Use    Vaping Use: Some days    Substances: Nicotine   Substance and Sexual Activity    Alcohol use:  Yes     Alcohol/week: 2.0 standard drinks     Types: 1 Glasses of wine, 1 Cans of beer per week     Comment: Social Occasional     Drug use: No    Sexual activity: Not Currently   Other Topics Concern    Not on file   Social History Narrative    Not on file     Social Determinants of Health Financial Resource Strain: Not on file   Food Insecurity: Not on file   Transportation Needs: Not on file   Physical Activity: Not on file   Stress: Not on file   Social Connections: Not on file   Intimate Partner Violence: Not on file   Housing Stability: Not on file      TOBACCO:   reports that she quit smoking about 4 years ago. Her smoking use included cigarettes. She has a 10.00 pack-year smoking history. She has never used smokeless tobacco.  ETOH:   reports current alcohol use of about 2.0 standard drinks per week. Family History:    History reviewed. No pertinent family history. REVIEW OF SYSTEMS:   Pertinent positives as noted in the HPI. All other systems reviewed and negative. PHYSICAL EXAM:  BP (!) 143/88   Pulse 80   Temp 97.9 °F (36.6 °C) (Temporal)   Resp 16   Ht 5' 2\" (1.575 m)   Wt 143 lb (64.9 kg)   SpO2 96%   BMI 26.16 kg/m²   Body mass index is 26.16 kg/m². General appearance: No apparent distress, appears stated age and cooperative. HEENT: Normal cephalic, atraumatic without obvious deformity. Pupils equal, round, and reactive to light. Extra ocular muscles intact. Conjunctivae/corneas clear. Neck: Supple, with full range of motion. No jugular venous distention. Trachea midline. Respiratory:  Normal respiratory effort. Clear to auscultation, bilaterally without crackles or rhonchi  Cardiovascular: regular rate,  rhythm with normal S1/S2 , with murmurs  Abdomen: Soft, non-tender, non-distended with normal bowel sounds. Musculoskeletal:  No clubbing, cyanosis or edema bilaterally. Skin: Skin color, texture, turgor normal.  No rashes or lesions. Neurologic:  sensory deficit present and gait abnormality per neurosurgery exam in chart. Psychiatric: Alert and oriented, thought content appropriate, normal insight    Labs:     CBC:   No results for input(s): WBC, RBC, HGB, HCT, MCV, RDW, PLT in the last 72 hours.   BMP: No results for input(s): NA, K, CL, CO2, BUN, CREATININE, CA, MG, PHOS in the last 72 hours. LFT:  No results for input(s): PROT, ALB, ALKPHOS, ALT, AST, BILITOT, AMYLASE, LIPASE in the last 72 hours. CE:  No results for input(s): Estle Carrow in the last 72 hours. PT/INR: No results for input(s): INR, APTT in the last 72 hours. BNP: No results for input(s): BNP in the last 72 hours. Hgb A1C: No results found for: LABA1C  No results found for: EAG  ESR: No results found for: SEDRATE  CRP:   Lab Results   Component Value Date    CRP 0.4 08/10/2017     D Dimer: No results found for: DDIMER  Folate and B12: No results found for: [id number], No results found for: FOLATE  Lactic Acid: No results found for: LACTA  Thyroid Studies:   Lab Results   Component Value Date    TSH 0.704 08/10/2017    Y3KGDZO 112.50 08/10/2017    F7AWXEE 6.5 08/10/2017     Imaging:  XR CHEST (2 VW)    Result Date: 11/18/2022  EXAMINATION: TWO XRAY VIEWS OF THE CHEST 11/18/2022 9:22 am COMPARISON: August 10, 2017 HISTORY: ORDERING SYSTEM PROVIDED HISTORY: Pre-op testing TECHNOLOGIST PROVIDED HISTORY: Reason for exam:->Pre-Op testing What reading provider will be dictating this exam?->CRC FINDINGS: There are some emphysematous changes lung parenchyma as the lungs are hyper distended with increased AP diameter as seen in the lateral projection. Heart is normal size. There is moderate to severe ectasia of the thoracic aorta. No acute infiltrates, consolidations or pleural effusions are seen. In the lateral aspect of the mid lower 3rd of the left lung in the frontal projection there is a small 8 by 6 mm nodule that was not seen on the previous study. Further evaluation with CT chest recommended the. No acute cardiopulmonary process. 8 x 6 mm nodule mid outer aspect of the left lung seen frontal projection, not observed on previous study. Further evaluation with CT chest recommended. Thanh FINE FOR SURGICAL PROCEDURES    Result Date: 11/28/2022  EXAMINATION: SPOT FLUOROSCOPIC IMAGES 11/28/2022 3:11 pm TECHNIQUE: Fluoroscopy was provided by the radiology department for procedure. Radiologist was not present during examination. FLUOROSCOPY DOSE AND TYPE OR TIME AND EXPOSURES: 3 images FLUOROSCOPY TIME: 21.1 seconds COMPARISON: None used HISTORY: ORDERING SYSTEM PROVIDED HISTORY: cervical fusion TECHNOLOGIST PROVIDED HISTORY: Reason for exam:->cervical fusion What reading provider will be dictating this exam?->CRC Intraprocedural imaging. FINDINGS: 3 spot images of the cervical spine were obtained. Fluoroscopic support provided to subspecialty service for cervical spine procedure. No obvious complication on the images provided. Please see subspecialty report for full details and interpretation of real time imaging. Intraprocedural fluoroscopic spot images as above. See separate procedure report for more information. FLUORO FOR SURGICAL PROCEDURES   Final Result   Intraprocedural fluoroscopic spot images as above. See separate procedure   report for more information.              ASSESSMENT:  Principal Problem:  - Displacement of cervical intervertebral disc without myelopathy  - HTN  - Hyperlipidemia       ASSESSMENT/PLAN:    - s/p C3-C4,C4-C5,C5-C6 ANTERIOR CERVICAL DISCECTOMY AND FUSION done today   - pain control  - pt/ot and weight bearing recommendations  - DVT prophylaxis  - above per neurosurgery recommendations   - HTN: controlled, resume home regimen losartan    - HLP: resume home regimen pravastatin   - DVT prophylaxis: per neuroSurgery clearance       Diet:  ADULT DIET; Easy to Chew  Thank you for allowing us to participate in this patient's care, and will be following up with you.    +++++++++++++++++++++++++++++++++++++++++++++++++  Olga Wilson MD

## 2022-11-30 VITALS
DIASTOLIC BLOOD PRESSURE: 68 MMHG | BODY MASS INDEX: 26.31 KG/M2 | SYSTOLIC BLOOD PRESSURE: 148 MMHG | HEIGHT: 62 IN | RESPIRATION RATE: 16 BRPM | HEART RATE: 76 BPM | WEIGHT: 143 LBS | TEMPERATURE: 98.9 F | OXYGEN SATURATION: 96 %

## 2022-11-30 PROCEDURE — 6370000000 HC RX 637 (ALT 250 FOR IP): Performed by: NEUROLOGICAL SURGERY

## 2022-11-30 PROCEDURE — 97530 THERAPEUTIC ACTIVITIES: CPT

## 2022-11-30 PROCEDURE — 97535 SELF CARE MNGMENT TRAINING: CPT

## 2022-11-30 PROCEDURE — 2580000003 HC RX 258: Performed by: NEUROLOGICAL SURGERY

## 2022-11-30 PROCEDURE — 6360000002 HC RX W HCPCS: Performed by: NEUROLOGICAL SURGERY

## 2022-11-30 RX ORDER — DIAZEPAM 5 MG/1
5 TABLET ORAL EVERY 6 HOURS PRN
Qty: 40 TABLET | Refills: 0 | Status: SHIPPED | OUTPATIENT
Start: 2022-11-30 | End: 2022-12-10

## 2022-11-30 RX ORDER — OXYCODONE HYDROCHLORIDE 5 MG/1
5 TABLET ORAL EVERY 4 HOURS PRN
Qty: 42 TABLET | Refills: 0 | Status: SHIPPED | OUTPATIENT
Start: 2022-11-30 | End: 2022-12-07

## 2022-11-30 RX ADMIN — ACETAMINOPHEN 650 MG: 325 TABLET, FILM COATED ORAL at 11:37

## 2022-11-30 RX ADMIN — OXYCODONE 5 MG: 5 TABLET ORAL at 11:37

## 2022-11-30 RX ADMIN — SENNOSIDES AND DOCUSATE SODIUM 1 TABLET: 50; 8.6 TABLET ORAL at 08:00

## 2022-11-30 RX ADMIN — DEXAMETHASONE SODIUM PHOSPHATE 4 MG: 4 INJECTION, SOLUTION INTRAMUSCULAR; INTRAVENOUS at 00:18

## 2022-11-30 RX ADMIN — DOCUSATE SODIUM 200 MG: 100 CAPSULE, LIQUID FILLED ORAL at 08:01

## 2022-11-30 RX ADMIN — WATER 2000 MG: 1 INJECTION INTRAMUSCULAR; INTRAVENOUS; SUBCUTANEOUS at 06:11

## 2022-11-30 RX ADMIN — POLYETHYLENE GLYCOL 3350 17 G: 17 POWDER, FOR SOLUTION ORAL at 08:01

## 2022-11-30 RX ADMIN — BISACODYL 5 MG: 5 TABLET, COATED ORAL at 08:00

## 2022-11-30 RX ADMIN — DEXAMETHASONE SODIUM PHOSPHATE 4 MG: 4 INJECTION, SOLUTION INTRAMUSCULAR; INTRAVENOUS at 06:11

## 2022-11-30 RX ADMIN — OXYCODONE HYDROCHLORIDE 10 MG: 10 TABLET ORAL at 06:11

## 2022-11-30 RX ADMIN — BUPROPION HYDROCHLORIDE 150 MG: 150 TABLET, EXTENDED RELEASE ORAL at 08:01

## 2022-11-30 RX ADMIN — GABAPENTIN 300 MG: 300 CAPSULE ORAL at 08:00

## 2022-11-30 RX ADMIN — PRAVASTATIN SODIUM 40 MG: 20 TABLET ORAL at 08:00

## 2022-11-30 RX ADMIN — ACETAMINOPHEN 650 MG: 325 TABLET, FILM COATED ORAL at 00:17

## 2022-11-30 RX ADMIN — DIAZEPAM 5 MG: 5 TABLET ORAL at 08:00

## 2022-11-30 RX ADMIN — ACETAMINOPHEN 650 MG: 325 TABLET, FILM COATED ORAL at 06:11

## 2022-11-30 RX ADMIN — BUSPIRONE HYDROCHLORIDE 15 MG: 7.5 TABLET ORAL at 08:00

## 2022-11-30 ASSESSMENT — PAIN DESCRIPTION - DESCRIPTORS
DESCRIPTORS: DISCOMFORT;SORE;TENDER
DESCRIPTORS: DISCOMFORT;SORE;TENDER

## 2022-11-30 ASSESSMENT — PAIN DESCRIPTION - LOCATION
LOCATION: NECK
LOCATION: NECK

## 2022-11-30 ASSESSMENT — PAIN SCALES - GENERAL
PAINLEVEL_OUTOF10: 7
PAINLEVEL_OUTOF10: 4

## 2022-11-30 NOTE — DISCHARGE INSTRUCTIONS
Discharge Instructions    1. No lifting more than 10 pounds. 2. C-spine collar to be worn at all times. 3. All stitches are under the skin and will dissolve in time. 4. Leave incision open to air. 5. Patient may shower, do not soak or scrub at the incision site. 6. Refrain from driving or sexual activity for 1 month. 7. Follow-up with Dr. Mick Silva in the office in 1 month with cervical x-rays.

## 2022-11-30 NOTE — CARE COORDINATION
11/30/22 Update CM Note: Patient is discharged to home with no needs.  Her  is picking her up at 2:00pm. Electronically signed by Elsy Muniz RN CM on 11/30/2022 at 11:22 AM

## 2022-11-30 NOTE — PROGRESS NOTES
Physical Therapy  Physical Therapy rx     Name: Owen Hubbard  : 1950  MRN: [id number]      Date of Service: 2022    Evaluating PT:  Sparkle Casillas PT, DPT    Room #:  2668/7546-C  Diagnosis:  Displacement of cervical intervertebral disc without myelopathy [M50.20]  Cervical spinal stenosis [M48.02]  PMHx/PSHx:  HTN, HLD, neuropathy  Procedure/Surgery:    C3-C4,C4-C5,C5-C6 ANTERIOR CERVICAL DISCECTOMY AND FUSION  Precautions:  fall risk, c/s collar, spine  Equipment Needs:  TBD    SUBJECTIVE:    Pt lives alone in a 1st floor apt with level entry. Pt ambulated with no AD PTA. OBJECTIVE:   Initial Evaluation  Date: 22 Treatment  Date: 22 Short Term/ Long Term   Goals   AM-PAC 6 Clicks  54/54    Was pt agreeable to Eval/treatment? yes Yes     Does pt have pain? 1/10 neck  Medicated   2/10    Bed Mobility  Rolling: SBA  Supine to sit: SBA  Sit to supine: NT  Scooting: SBA Rolling s  Supine to sit s  Sit to supine nt  Scooting s Rolling: IND  Supine to sit: IND  Sit to supine: IND  Scooting: IND   Transfers Sit to stand: CGA  Stand to sit: CGA  Stand pivot: CGA with no AD Sit to stand mod I  Stand to sit mod I  Stand pivot no AD  Supervision  Sit to stand: mod I  Stand to sit: mod I  Stand pivot: mod I with AAD   Ambulation    150'x2 with no AD  feet no ad supervision/independent  300'+ with AAD mod I   Stair negotiation: ascended and descended  NT Pt declined  No steps       Pt is A & O x    Edema:  nt      Therapeutic Exercises: function    Patient education  Pt educated on cervical precautions and safety and affects of cervical collar     Patient response to education:   Pt verbalized understanding Pt demonstrated skill Pt requires further education in this area   x x x     ASSESSMENT:    Comments:  pt required cues for log roll for bed. Educated on precautions and verbally discussed and therapist demonstrated to pt. Pt performed ambulation and had no lob.    Discussed her richard and the fact she toes out and has tendency to walk on lateral foot. Pt reports she has had numerous sx on her feet with no success with correcting her toe out. Educated pt on using caution due to her richard. Pt reports she uses much more supportive shoe at home which helps. Pt reports she has ww and rollator walker at home and she can use if necessary. Pt  had no lob through out gait performed changes of direction well with her balance. Pt left sitting in chair with all needs met. Treatment:  Patient practiced and was instructed in the following treatment:    Bed mobility  Gait  Pt education          PLAN:    Patient is making good progress towards established goals. Will continue with current POC.       Time in  1158  Time out  1225     Total Treatment Time  27 minutes       CPT codes:  [x] Therapeutic activities 99290 27 minutes  [] Therapeutic exercises 46454 0 minutes      Brenden Chase, [address]

## 2022-11-30 NOTE — PROGRESS NOTES
Occupational Therapy  OT BEDSIDE TREATMENT NOTE   [address]:  Patient Name: Uday Patel  MRN: [id number]  : 1950  Room: [address] [place] [address]     Per OT Eval:    Evaluating OT: Milana Bell, [address], OTR/L [id number]  Referring Juana Ewing MD  Specific Provider Orders: OT eval and treat   Recommended Adaptive Equipment: shower chair      Diagnosis: cervical stenosis   Surgery:   C3-C4,C4-C5,C5-C6 ANTERIOR CERVICAL DISCECTOMY AND FUSION  Pertinent Medical History: HTN, HLD, neuropathy   Precautions:  Fall Risk, cervical precautions, collar     Assessment of current deficits   [x] Functional mobility           [x]ADLs           [x] Strength                  [x]Cognition   [x] Functional transfers         [x] IADLs         [x] Safety Awareness   [x]Endurance   [] Fine Coordination                         [x] Balance      [] Vision/perception   [x]Sensation     []Gross Motor Coordination             [] ROM           [] Delirium                   [] Motor Control      OT PLAN OF CARE   OT POC based on physician orders, patient diagnosis and results of clinical assessment     Frequency/Duration: 2-4 days/wk for 2 weeks PRN   Specific OT Treatment to include:   * Instruction/training on adapted ADL techniques and AE recommendations to increase functional independence within precautions       * Training on energy conservation strategies, correct breathing pattern and techniques to improve independence/tolerance for self-care routine  * Functional transfer/mobility training/DME recommendations for increased independence, safety, and fall prevention  * Patient/Family education to increase follow through with safety techniques and functional independence  * Recommendation of environmental modifications for increased safety with functional transfers/mobility and ADLs  * Therapeutic exercise to improve motor endurance, ROM, and functional strength for ADLs/functional transfers  * Therapeutic activities to facilitate/challenge dynamic balance, stand tolerance for increased safety and independence with ADLs  * Neuro-muscular re-education: facilitation of righting/equilibrium reactions, midline orientation, scapular stability/mobility, normalization of muscle tone, and facilitation of volitional active controled movement     Home Living: Pt lives alone in a 1 story home; bed/bath on main level   Bathroom setup: tub shower unit   Equipment owned: cane, ww  Prior Level of Function: IND with ADLs/IADLs; using no AD for functional mobility      Pain Level: Pt did not complain of pain this session  Cognition: A&O: 3/4; Follows multi step directions              Memory:  good              Sequencing:  good              Problem solving:  fair              Judgement/safety:  fair      Functional Assessment:  AM-PAC Daily Activity Raw Score: 19/24    Initial Eval Status  Date: 11/29/22 Treatment Status  Date: 11/30/22 STGs=LTGs  Time Frame: 10-14 days   Feeding IND (pt able to open packages and self feed)   Independent     Grooming Min A (standing at sink)   Setup  Pt washed face, applied deodorant, combed hair, brushed teeth seated upright in chair at sink SBA   UB Dressing Min A   Setup  Pt georgi hannah tank top and pull over shirt seated SBA   LB Dressing Min A (pt used crossing of leg technique to doff/don B socks) SBA  Pt donned underwear and pants, able to thread and stand to pull over hips  SBA    Bathing Min A (simulated)  SBA  Sponge bathing task seated/standing, pt able to wash of UB, using cross over technique to wash of LB, able to stand and wash of buttocks/javier area SBA    Toileting Min A (assist with balance) SBA  Pt completed toileting task x2 on standard commode, pt able to complete of transfer, manage of gown, and complete of hygiene  SBA   Bed Mobility  Log roll: SBA  Supine to sit: SBA   Sit to supine: NT DNT  SBA per previous session    Pt seated upright in chair upon arrival and at end of session Log roll IND  Supine to sit: IND   Sit to supine: IND   Functional Transfers Sit to stand:Min A   Stand to sit:Min A  Commode: Min A  SBA  Sit to Stand  Stand to Sit    Cueing for hand placement SBA   Functional Mobility Min A (hand held assist, to/from bathroom)  SBA  Pt ambulated short household distance in room EOB<>Bathroom with no device SBA   Balance Sitting: SBA  Standing: Min A  Sitting:  Independent    Standing:  SBA     Activity Tolerance fair  Fair-     Visual/  Perceptual Glasses: yes                                  UE ROM:        RUE:  WFL                  LUE:  WFL  Strength:        RUE: grossly 3/5         LUE: grossly 3/5   Strength: B WFL  Fine Motor Coordination:  WFL      Hearing: WFL  Sensation:  No c/o numbness/tingling   Tone:  WFL  Edema: none noted        Education:  Pt was educated on role of OT, goals to be reached, importance of OOB activity, precautions to follow, safety and hand placement with transfers, safety/balance with functional mobility, and techniques to assist with LB dressing/bathing tasks     Comments: Upon arrival pt seated upright in chair, agreeable to therapy, speaking with nursing okaying pt to be seen this session. Pt compelted of transfers, functional mobility and ADL tasks this session. At end of session, pt seated upright in chair, all lines and tubes intact, call light within reach. Pt has made fair progress towards set goals.    Continue with current plan of care focusing on increasing of independency with transfers and ADL tasks      Treatment Time In: 9:30am           Treatment Time Out: 10:08am                Treatment Charges: Mins Units   Ther Ex  [id number]     Manual Therapy [id number]     Thera Activities 10861     ADL/Home Mgt [id number] 3   Neuro Re-ed [id number]     Group Therapy      Orthotic manage/training  20831     Non-Billable Time     Total Timed Treatment [facility] ROJAS/L [id number]

## 2022-11-30 NOTE — DISCHARGE SUMMARY
Discharge Summary    [place] [address] SUMMARY:                The patient is a 70 y.o. female who was admitted to the hospital on 11/28/2022  7:53 AM for treatment of neck pain. On the day of admission, a cervical fusion was performed. The patient's hospital course was uncomplicated and consisted of physical therapy, incision observation, and a return to normal oral intake. The patient was discharged on 11/30/2022  3:11 PM tolerating a diet, moving bowels, and urinating without difficulty. The incisions were clean and intact. The patient was discharged to home in satisfactory condition with instructions to call the office for a follow up appointment. Hospital Problem List:  Principal Problem:    Displacement of cervical intervertebral disc without myelopathy  Active Problems:    Cervical spinal stenosis  Resolved Problems:    * No resolved hospital problems. *     Procedure(s) (LRB):  C3-C4,C4-C5,C5-C6 ANTERIOR CERVICAL DISCECTOMY AND FUSION (N/A)    Discharge Medications:      Medication List        START taking these medications      diazePAM 5 MG tablet  Commonly known as: VALIUM  Take 1 tablet by mouth every 6 hours as needed for Anxiety (Muscle spasms) for up to 10 days. oxyCODONE 5 MG immediate release tablet  Commonly known as: ROXICODONE  Take 1 tablet by mouth every 4 hours as needed for Pain for up to 7 days.             CONTINUE taking these medications      albuterol sulfate  (90 Base) MCG/ACT inhaler  Commonly known as: PROVENTIL;VENTOLIN;PROAIR     alendronate 70 MG tablet  Commonly known as: FOSAMAX     buPROPion 150 MG extended release tablet  Commonly known as: WELLBUTRIN SR     busPIRone 15 MG tablet  Commonly known as: BUSPAR     ergocalciferol 1.25 MG (50462 UT) capsule  Commonly known as: ERGOCALCIFEROL     gabapentin 300 MG capsule  Commonly known as: NEURONTIN     losartan 50 MG tablet  Commonly known as: COZAAR     pravastatin 40 MG tablet  Commonly known as: PRAVACHOL            STOP taking these medications      ibuprofen 800 MG tablet  Commonly known as: ADVIL;MOTRIN               Where to Get Your Medications        These medications were sent to [facility] [address] [address]      Phone: [phone]   diazePAM 5 MG tablet  oxyCODONE 5 MG immediate release tablet         [place], [place]  11/30/2022

## 2022-11-30 NOTE — PROGRESS NOTES
Department of Neurosurgery  Progress Note    CHIEF COMPLAINT: s/p C3-C6 ACDF 11/28    SUBJECTIVE:  doing well. No new concerns    REVIEW OF SYSTEMS :  Constitutional: Negative for chills and fever. Neurological: Negative for dizziness, tremors and speech change.      OBJECTIVE:   VITALS:  BP (!) 147/85   Pulse 84   Temp 98.7 °F (37.1 °C) (Temporal)   Resp 16   Ht 5' 2\" (1.575 m)   Wt 143 lb (64.9 kg)   SpO2 95%   BMI 26.16 kg/m²     PHYSICAL:  Neurologic: Alert and oriented x3; PERRL  Motor Exam:  4/5 hand intrinsics   Decreased sensation to LT in both hands  Incision c/d/i      DATA:  CBC:   Lab Results   Component Value Date/Time    WBC 11.3 11/29/2022 05:53 AM    RBC 3.64 11/29/2022 05:53 AM    HGB 11.6 11/29/2022 05:53 AM    HCT 36.1 11/29/2022 05:53 AM    MCV 99.2 11/29/2022 05:53 AM    MCH 31.9 11/29/2022 05:53 AM    MCHC 32.1 11/29/2022 05:53 AM    RDW 13.2 11/29/2022 05:53 AM     11/29/2022 05:53 AM    MPV 11.3 11/29/2022 05:53 AM     BMP:    Lab Results   Component Value Date/Time     11/29/2022 05:53 AM    K 4.5 11/29/2022 05:53 AM    K 4.3 11/18/2022 09:00 AM     11/29/2022 05:53 AM    CO2 22 11/29/2022 05:53 AM    BUN 11 11/29/2022 05:53 AM    LABALBU 4.1 08/10/2017 09:15 AM    CREATININE 0.5 11/29/2022 05:53 AM    CALCIUM 8.6 11/29/2022 05:53 AM    GFRAA >60 08/10/2017 09:15 AM    LABGLOM >60 11/29/2022 05:53 AM    GLUCOSE 154 11/29/2022 05:53 AM     PT/INR:    Lab Results   Component Value Date/Time    PROTIME 10.9 11/18/2022 09:00 AM    INR 1.0 11/18/2022 09:00 AM     PTT:  No results found for: APTT, PTT[APTT}    Current Inpatient Medications  Current Facility-Administered Medications: albuterol (PROVENTIL) nebulizer solution 2.5 mg, 2.5 mg, Nebulization, Q4H PRN  buPROPion (WELLBUTRIN SR) extended release tablet 150 mg, 150 mg, Oral, Daily  busPIRone (BUSPAR) tablet 15 mg, 15 mg, Oral, BID  gabapentin (NEURONTIN) capsule 300 mg, 300 mg, Oral, TID  losartan (COZAAR) tablet 50 mg, 50 mg, Oral, Daily  pravastatin (PRAVACHOL) tablet 40 mg, 40 mg, Oral, Daily  sodium chloride flush 0.9 % injection 5-40 mL, 5-40 mL, IntraVENous, 2 times per day  sodium chloride flush 0.9 % injection 5-40 mL, 5-40 mL, IntraVENous, PRN  0.9 % sodium chloride infusion, , IntraVENous, PRN  acetaminophen (TYLENOL) tablet 650 mg, 650 mg, Oral, Q6H  ondansetron (ZOFRAN-ODT) disintegrating tablet 4 mg, 4 mg, Oral, Q8H PRN **OR** ondansetron (ZOFRAN) injection 4 mg, 4 mg, IntraVENous, Q6H PRN  0.9 % sodium chloride infusion, , IntraVENous, Continuous  ceFAZolin (ANCEF) 2,000 mg in sterile water 20 mL IV syringe, 2,000 mg, IntraVENous, Q8H  oxyCODONE (ROXICODONE) immediate release tablet 5 mg, 5 mg, Oral, Q4H PRN **OR** oxyCODONE HCl (OXY-IR) immediate release tablet 10 mg, 10 mg, Oral, Q4H PRN  morphine (PF) injection 2 mg, 2 mg, IntraVENous, Q2H PRN **OR** morphine sulfate (PF) injection 4 mg, 4 mg, IntraVENous, Q2H PRN  diazePAM (VALIUM) tablet 5 mg, 5 mg, Oral, Q6H PRN  diphenhydrAMINE (BENADRYL) tablet 25 mg, 25 mg, Oral, Q6H PRN **OR** diphenhydrAMINE (BENADRYL) injection 25 mg, 25 mg, IntraVENous, Q6H PRN  polyethylene glycol (GLYCOLAX) packet 17 g, 17 g, Oral, Daily  bisacodyl (DULCOLAX) EC tablet 5 mg, 5 mg, Oral, Daily  sennosides-docusate sodium (SENOKOT-S) 8.6-50 MG tablet 1 tablet, 1 tablet, Oral, BID  bisacodyl (DULCOLAX) suppository 10 mg, 10 mg, Rectal, Daily PRN  sodium phosphate (FLEET) rectal enema 1 enema, 1 enema, Rectal, Daily PRN  benzocaine-menthol (CEPACOL SORE THROAT) lozenge 1 lozenge, 1 lozenge, Oral, Q2H PRN  dexamethasone (DECADRON) injection 4 mg, 4 mg, IntraVENous, Q6H  docusate sodium (COLACE) capsule 200 mg, 200 mg, Oral, BID    ASSESSMENT:   s/p C3-C6 ACDF 11/28  Admission due to IV pain medications    PLAN:  -Pain control  -PT/OT  -Custom collar  -Follow up in Neurosurgery clinic in 4 weeks with XR  -d/c home with [facility] PT/nursing      Electronically signed by Berenice Ovalles Sanjuanita Peña, [id number] on 11/30/2022 at 8:35 AM

## 2022-11-30 NOTE — PROGRESS NOTES
CLINICAL PHARMACY NOTE: MEDS TO BEDS    Total # of Prescriptions Filled: 2   The following medications were delivered to the patient:  Oxycodone 5 mg  Diazepam 5 mg    Additional Documentation:

## 2022-11-30 NOTE — PROGRESS NOTES
Subjective:    Patient stable from a medical standpoint. [employer] signing off of patient  Please feel free to reconsult at any time if needed  Non-billable rounding    Chief complaint: Ms. Hans Aquino, a 70y.o. year old female  who  has a past medical history of Hyperlipidemia, Hypertension, and Neuropathy. A 70 yr old lady s/p C3-C4,C4-C5,C5-C6 ANTERIOR CERVICAL DISCECTOMY AND FUSION, done today. Pt had neck pain for 4 months, midline neck pain aching and shooting severity 9/10, tried chiropractor manipulation and gabapentin and motrin with only mild relief. Objective:    BP (!) 147/85   Pulse 84   Temp 98.7 °F (37.1 °C) (Temporal)   Resp 16   Ht 5' 2\" (1.575 m)   Wt 143 lb (64.9 kg)   SpO2 95%   BMI 26.16 kg/m²   General : Awake ,alert,no distress. Heart:  RRR, no murmurs, gallops, or rubs.   Lungs:  CTA bilaterally, no wheeze, rales or rhonchi  Abd: bowel sounds present, nontender, nondistended, no masses  Extrem:  No clubbing, cyanosis, or edema    CBC:   Lab Results   Component Value Date/Time    WBC 11.3 11/29/2022 05:53 AM    RBC 3.64 11/29/2022 05:53 AM    HGB 11.6 11/29/2022 05:53 AM    HCT 36.1 11/29/2022 05:53 AM    MCV 99.2 11/29/2022 05:53 AM    MCH 31.9 11/29/2022 05:53 AM    MCHC 32.1 11/29/2022 05:53 AM    RDW 13.2 11/29/2022 05:53 AM     11/29/2022 05:53 AM    MPV 11.3 11/29/2022 05:53 AM     BMP:    Lab Results   Component Value Date/Time     11/29/2022 05:53 AM    K 4.5 11/29/2022 05:53 AM    K 4.3 11/18/2022 09:00 AM     11/29/2022 05:53 AM    CO2 22 11/29/2022 05:53 AM    BUN 11 11/29/2022 05:53 AM    LABALBU 4.1 08/10/2017 09:15 AM    CREATININE 0.5 11/29/2022 05:53 AM    CALCIUM 8.6 11/29/2022 05:53 AM    GFRAA >60 08/10/2017 09:15 AM    LABGLOM >60 11/29/2022 05:53 AM    GLUCOSE 154 11/29/2022 05:53 AM     PT/INR:    Lab Results   Component Value Date/Time    PROTIME 10.9 11/18/2022 09:00 AM    INR 1.0 11/18/2022 09:00 AM     Troponin: No results found for: TROPONINI    No results for input(s): Frank Velazquez in the last 72 hours. No results for input(s): BC in the last 72 hours. No results for input(s): Jene Sever in the last 72 hours.       Current Facility-Administered Medications:     albuterol (PROVENTIL) nebulizer solution 2.5 mg, 2.5 mg, Nebulization, Q4H PRN, Joy Latham MD    buPROPion [facility] SR) extended release tablet 150 mg, 150 mg, Oral, Daily, Joy Latham MD, 150 mg at 11/30/22 0801    busPIRone (BUSPAR) tablet 15 mg, 15 mg, Oral, BID, Joy Latham MD, 15 mg at 11/30/22 0800    gabapentin (NEURONTIN) capsule 300 mg, 300 mg, Oral, TID, Joy Latham MD, 300 mg at 11/30/22 0800    losartan (COZAAR) tablet 50 mg, 50 mg, Oral, Daily, Joy Latham MD    pravastatin (PRAVACHOL) tablet 40 mg, 40 mg, Oral, Daily, Joy Latham MD, 40 mg at 11/30/22 0800    sodium chloride flush 0.9 % injection 5-40 mL, 5-40 mL, IntraVENous, 2 times per day, Joy Latham MD, 10 mL at 11/29/22 2024    sodium chloride flush 0.9 % injection 5-40 mL, 5-40 mL, IntraVENous, PRN, Joy Latham MD    0.9 % sodium chloride infusion, , IntraVENous, PRN, Joy Latham MD    acetaminophen (TYLENOL) tablet 650 mg, 650 mg, Oral, Q6H, Joy Latham MD, 650 mg at 11/30/22 1137    ondansetron (ZOFRAN-ODT) disintegrating tablet 4 mg, 4 mg, Oral, Q8H PRN **OR** ondansetron (ZOFRAN) injection 4 mg, 4 mg, IntraVENous, Q6H PRN, Joy Latham MD    0.9 % sodium chloride infusion, , IntraVENous, Continuous, Joy Latham MD, Last Rate: 50 mL/hr at 11/28/22 1707, New Bag at 11/28/22 1707    ceFAZolin (ANCEF) 2,000 mg in sterile water 20 mL IV syringe, 2,000 mg, IntraVENous, Q8H, Joy Latham MD, 2,000 mg at 11/30/22 4690    oxyCODONE (ROXICODONE) immediate release tablet 5 mg, 5 mg, Oral, Q4H PRN, 5 mg at 11/30/22 1137 **OR** oxyCODONE HCl (OXY-IR) immediate release tablet 10 mg, 10 mg, Oral, Q4H PRN, Joy Latham MD, 10 mg at 11/30/22 0611    morphine (PF) injection 2 mg, 2 mg, IntraVENous, Q2H PRN **OR** morphine sulfate (PF) injection 4 mg, 4 mg, IntraVENous, Q2H PRN, Delano Hightower MD, 4 mg at 11/29/22 0118    diazePAM (VALIUM) tablet 5 mg, 5 mg, Oral, Q6H PRN, Delano Hightower MD, 5 mg at 11/30/22 0800    diphenhydrAMINE (BENADRYL) tablet 25 mg, 25 mg, Oral, Q6H PRN **OR** diphenhydrAMINE (BENADRYL) injection 25 mg, 25 mg, IntraVENous, Q6H PRN, Delano Hightower MD    polyethylene glycol (GLYCOLAX) packet 17 g, 17 g, Oral, Daily, Delano Hightower MD, 17 g at 11/30/22 0801    bisacodyl (DULCOLAX) EC tablet 5 mg, 5 mg, Oral, Daily, Delano Hightower MD, 5 mg at 11/30/22 0800    sennosides-docusate sodium (SENOKOT-S) 8.6-50 MG tablet 1 tablet, 1 tablet, Oral, BID, Delano Hightower MD, 1 tablet at 11/30/22 0800    bisacodyl (DULCOLAX) suppository 10 mg, 10 mg, Rectal, Daily PRN, Delano Hightower MD    sodium phosphate (FLEET) rectal enema 1 enema, 1 enema, Rectal, Daily PRN, Delano Hightower MD    benzocaine-menthol (CEPACOL SORE THROAT) lozenge 1 lozenge, 1 lozenge, Oral, Q2H PRN, Delano Hightower MD    dexamethasone (DECADRON) injection 4 mg, 4 mg, IntraVENous, Q6H, Delano Hightower MD, 4 mg at 11/30/22 0546    docusate sodium (COLACE) capsule 200 mg, 200 mg, Oral, BID, Delano Hightower MD, 200 mg at 11/30/22 0801    ADULT DIET; Easy to Pollack Melonie   Final Result   Intraprocedural fluoroscopic spot images as above. See separate procedure   report for more information. Assessment:    Principal Problem:    Displacement of cervical intervertebral disc without myelopathy  Active Problems:    Cervical spinal stenosis  Resolved Problems:    * No resolved hospital problems. *  Hypertension  Hyperlipidemia  Depression      Plan:  Continue current medications  Home medications have been ordered  Follow along with neurosurgery        JASE Crawley - CNP  12:22 PM  11/30/2022    NOTE: This report was transcribed using voice recognition software.  Every effort was made to ensure accuracy; however, inadvertent transcription errors may be present

## 2022-12-30 DIAGNOSIS — M48.02 CERVICAL SPINAL STENOSIS: ICD-10-CM

## 2022-12-30 DIAGNOSIS — Z01.818 PRE-OP TESTING: Primary | ICD-10-CM

## 2023-01-03 ENCOUNTER — HOSPITAL ENCOUNTER (OUTPATIENT)
Age: 73
Discharge: HOME | End: 2023-01-05
Payer: MEDICARE

## 2023-01-03 ENCOUNTER — HOSPITAL ENCOUNTER (OUTPATIENT)
Dept: GENERAL RADIOLOGY | Age: 73
Discharge: HOME | End: 2023-01-05
Payer: MEDICARE

## 2023-01-03 ENCOUNTER — OFFICE VISIT (OUTPATIENT)
Dept: NEUROSURGERY | Age: 73
End: 2023-01-03
Payer: MEDICARE

## 2023-01-03 VITALS
SYSTOLIC BLOOD PRESSURE: 139 MMHG | RESPIRATION RATE: 16 BRPM | TEMPERATURE: 97.2 F | DIASTOLIC BLOOD PRESSURE: 75 MMHG | OXYGEN SATURATION: 93 % | HEART RATE: 83 BPM

## 2023-01-03 DIAGNOSIS — Z98.1 S/P CERVICAL SPINAL FUSION: Primary | ICD-10-CM

## 2023-01-03 DIAGNOSIS — M48.02 CERVICAL SPINAL STENOSIS: ICD-10-CM

## 2023-01-03 PROCEDURE — 99212 OFFICE O/P EST SF 10 MIN: CPT

## 2023-01-03 PROCEDURE — 72040 X-RAY EXAM NECK SPINE 2-3 VW: CPT

## 2023-01-03 PROCEDURE — 99024 POSTOP FOLLOW-UP VISIT: CPT | Performed by: STUDENT IN AN ORGANIZED HEALTH CARE EDUCATION/TRAINING PROGRAM

## 2023-01-03 NOTE — PROGRESS NOTES
Post-Operative Follow-up     This is a 67year old female who presents to the office for a 1 month follow-up s/p C3-C6 ACDF     Subjective: Patient states she is doing okay. She states she does still have some electric pain in her hands but this has improved since surgery. She also states she is regaining feeling her in fingers. She denies any significant neck pain. Patient is C-Collar and Bone Stimulator complaint. XR reviewed with patient. Upon further questioning patient does admit to some depression, offer referral for psych-patient declines at this time. Physical Exam:              WDWN, no apparent distress              Non-labored breathing               Vitals Stable              Alert and oriented x3              CN 3-12 intact              PERRL              EOMI              PRUITT well              Motor strength symmetric              Sensation to LT intact bilaterally   In C-Collar   Incision healing well without signs of infection. Imagin2022 XR Cervical Spine  Impression   Intact cervical fusion hardware with disc augmentation and grade 1   anterolisthesis of C3 on C4 as well as C4 on C5. Degenerative cervical   spondylosis as noted. Assessment: This is a 67 y.o.  female presenting for a 1 month follow-up s/p C3-C6 ACDF. Stable. Plan:  -Pain control and expectations discussed  -Continue brace and restrictions x 2 more months  -OARRS report reviewed   -Follow-up in neurosurgery clinic in 2 months with repeat Cervical XR  -Call or return to neurosurgery office sooner if symptoms worsen or if new issues arise in the interim.     Electronically signed by Jeannette Rose PA-C on 1/3/2023 at 10:13 AM

## 2023-02-21 ENCOUNTER — HOSPITAL ENCOUNTER (OUTPATIENT)
Age: 73
Discharge: HOME | End: 2023-02-23
Payer: MEDICARE

## 2023-02-21 ENCOUNTER — HOSPITAL ENCOUNTER (OUTPATIENT)
Dept: GENERAL RADIOLOGY | Age: 73
Discharge: HOME | End: 2023-02-23
Payer: MEDICARE

## 2023-02-21 ENCOUNTER — OFFICE VISIT (OUTPATIENT)
Dept: NEUROSURGERY | Age: 73
End: 2023-02-21
Payer: MEDICARE

## 2023-02-21 VITALS — RESPIRATION RATE: 16 BRPM

## 2023-02-21 DIAGNOSIS — Z98.1 S/P CERVICAL SPINAL FUSION: ICD-10-CM

## 2023-02-21 DIAGNOSIS — Z98.1 S/P CERVICAL SPINAL FUSION: Primary | ICD-10-CM

## 2023-02-21 PROCEDURE — 99024 POSTOP FOLLOW-UP VISIT: CPT | Performed by: STUDENT IN AN ORGANIZED HEALTH CARE EDUCATION/TRAINING PROGRAM

## 2023-02-21 PROCEDURE — 99212 OFFICE O/P EST SF 10 MIN: CPT | Performed by: STUDENT IN AN ORGANIZED HEALTH CARE EDUCATION/TRAINING PROGRAM

## 2023-02-21 PROCEDURE — 72040 X-RAY EXAM NECK SPINE 2-3 VW: CPT

## 2023-02-21 NOTE — PROGRESS NOTES
Post-Operative Follow-up     This is a 67year old female who presents to the office for a 3 month follow-up s/p C3-C6 ACDF     Subjective: Patient states she is doing well. She denies any significant neck pain. No pain down the arms. No new weakness, but states she does still have numbness in her arms. No other complaints. Collar and bone stimulator complaint. XR reviewed with patient. Physical Exam:              WDWN, no apparent distress              Non-labored breathing               Vitals Stable              Alert and oriented x3              CN 3-12 intact              PERRL              EOMI              PRUITT well              Motor strength symmetric              Sensation to LT intact bilaterally   In C-Collar   Incision healing well without signs of infection. Imagin2022 XR Cervical Spine  Stable alignment, stable fusion. No acute abnormalities noted. Final report pending. Assessment: This is a 67 y.o.  female presenting for a 3 month follow-up s/p C3-C6 ACDF. Stable. Plan:  -Pain control and expectations discussed  -Can discontinue brace and restrictions   -PT referral offered, patient to remain off work for 4 more weeks   -OARRS report reviewed   -Follow-up in neurosurgery clinic in 9 months for 1 year follow up in repeat XR  -Call or return to neurosurgery office sooner if symptoms worsen or if new issues arise in the interim.     Electronically signed by Yazmin Mckeon PA-C on 2023 at 6:05 PM

## 2023-02-21 NOTE — LETTER
[address] [place] [address] 52. [address] [address]  Phone: [phone]  Fax: [phone]    Greg Chambers PA-C        February 21, 2023     Patient: Brennon Contreras   YOB: 1950   Date of Visit: 2/21/2023       To Whom It May Concern: It is my medical opinion that Brennon Contreras may return to full duty on 3/20/2023  with no restrictions. If you have any questions or concerns, please don't hesitate to call.     Sincerely,        Greg Chambers PA-C

## 2024-08-23 ENCOUNTER — APPOINTMENT (OUTPATIENT)
Dept: GENERAL RADIOLOGY | Age: 74
End: 2024-08-23
Payer: COMMERCIAL

## 2024-08-23 ENCOUNTER — HOSPITAL ENCOUNTER (EMERGENCY)
Age: 74
Discharge: HOME | End: 2024-08-23
Payer: COMMERCIAL

## 2024-08-23 VITALS
TEMPERATURE: 97.5 F | DIASTOLIC BLOOD PRESSURE: 88 MMHG | BODY MASS INDEX: 24.33 KG/M2 | WEIGHT: 133 LBS | OXYGEN SATURATION: 98 % | SYSTOLIC BLOOD PRESSURE: 149 MMHG | RESPIRATION RATE: 20 BRPM | HEART RATE: 77 BPM

## 2024-08-23 DIAGNOSIS — S42.402A CLOSED FRACTURE OF LEFT ELBOW, INITIAL ENCOUNTER: Primary | ICD-10-CM

## 2024-08-23 PROCEDURE — 73070 X-RAY EXAM OF ELBOW: CPT

## 2024-08-23 PROCEDURE — 99283 EMERGENCY DEPT VISIT LOW MDM: CPT

## 2024-08-23 PROCEDURE — 73110 X-RAY EXAM OF WRIST: CPT

## 2024-08-23 RX ORDER — NAPROXEN 500 MG/1
500 TABLET ORAL 2 TIMES DAILY
Qty: 14 TABLET | Refills: 0 | Status: SHIPPED | OUTPATIENT
Start: 2024-08-23 | End: 2024-08-30

## 2024-08-23 RX ORDER — ASPIRIN 81 MG/1
81 TABLET ORAL DAILY
COMMUNITY

## 2024-08-23 ASSESSMENT — LIFESTYLE VARIABLES: HOW OFTEN DO YOU HAVE A DRINK CONTAINING ALCOHOL: NEVER

## 2024-08-23 NOTE — ED NOTES
Patient given worker comp paper work and protocols for drug testing prior to discharge, Provider spoke to provider regarding worker comp and protocols regarding drug testing.

## 2024-08-23 NOTE — ED PROVIDER NOTES
Independent ALEX Visit.     [facility]  Department of Emergency Medicine   ED  Encounter Note  Admit Date/RoomTime: 2024  1:23 PM  ED Room:   NAME: Susana Pedro  : 1950  MRN: [id number]     Chief Complaint:  Arm Injury (Left arm/elbow pain after a fall 5 days ago while at work, pt did have an xray done, was at pcp this am and sent here)    HISTORY OF PRESENT ILLNESS        Susana Pedro is a 73 y.o. female who presents to the ED stating she has an elbow fracture.  Patient fell at work 5 days ago.  States she tripped and came down on her left arm.  She just went to her doctors yesterday.  Outpatient x-ray notes a left elbow fracture.  She was called today and told to go to the emergency room.  At home she has been applying an Ace wrap and wearing a left wrist Velcro splint.  She denies any other injuries.  States she might of bumped the side of her head.  But no headache.  Not lightheaded or dizzy.  Symptoms are moderate in severity.      ROS   Pertinent positives and negatives are stated within HPI, all other systems reviewed and are negative.    Past Medical History:  has a past medical history of Hyperlipidemia, Hypertension, and Neuropathy.    Surgical History:  has a past surgical history that includes Tonsillectomy; hernia repair; Appendectomy; Foot surgery (Bilateral); fracture surgery; eye surgery; and cervical fusion (N/A, 2022).    Social History:  reports that she quit smoking about 6 years ago. Her smoking use included cigarettes. She started smoking about 26 years ago. She has a 10 pack-year smoking history. She has never used smokeless tobacco. She reports current alcohol use of about 2.0 standard drinks of alcohol per week. She reports that she does not use drugs.    Family History: family history is not on file.     Allergies: Patient has no known allergies.    PHYSICAL EXAM   Oxygen Saturation Interpretation: Normal on room air analysis.        ED Triage

## 2025-04-07 ENCOUNTER — OFFICE VISIT (OUTPATIENT)
Age: 75
End: 2025-04-07
Payer: MEDICARE

## 2025-04-07 VITALS — HEIGHT: 62 IN | TEMPERATURE: 97.8 F | WEIGHT: 134 LBS | BODY MASS INDEX: 24.66 KG/M2

## 2025-04-07 DIAGNOSIS — M16.11 PRIMARY OSTEOARTHRITIS OF RIGHT HIP: Primary | ICD-10-CM

## 2025-04-07 PROCEDURE — 1123F ACP DISCUSS/DSCN MKR DOCD: CPT | Performed by: ORTHOPAEDIC SURGERY

## 2025-04-07 PROCEDURE — 99213 OFFICE O/P EST LOW 20 MIN: CPT | Performed by: ORTHOPAEDIC SURGERY

## 2025-04-07 NOTE — PROGRESS NOTES
97.8 °F (36.6 °C)   Ht 1.575 m (5' 2\")   Wt 60.8 kg (134 lb)   BMI 24.51 kg/m²   Head: Normocephalic and atraumatic.   Neck: Neck supple.  Eyes: EOM are normal.   Pulmonary/Chest: Effort normal.  No respiratory distress, no wheezes.   Neurological: Alert and oriented to person  Skin: Skin is warm and dry.   Decreased range of motion of the hip pain with range of motion    Imaging:   None  Radiographic findings reviewed with patient      Assessment/Plan:     1. Primary osteoarthritis of right hip    We will schedule for another cortisone injection given the right hip.  Return to see me in August at which time we will schedule her for a total hip replacement.  No follow-ups on file.     Omkar Madrigal Jr, MD

## 2025-04-21 ENCOUNTER — HOSPITAL ENCOUNTER (OUTPATIENT)
Dept: INTERVENTIONAL RADIOLOGY/VASCULAR | Age: 75
Discharge: HOME | End: 2025-04-23
Payer: MEDICARE

## 2025-04-21 DIAGNOSIS — M16.11 PRIMARY OSTEOARTHRITIS OF RIGHT HIP: ICD-10-CM

## 2025-04-21 PROCEDURE — 6360000004 HC RX CONTRAST MEDICATION: Performed by: RADIOLOGY

## 2025-04-21 PROCEDURE — 77002 NEEDLE LOCALIZATION BY XRAY: CPT

## 2025-04-21 PROCEDURE — 6360000002 HC RX W HCPCS: Performed by: RADIOLOGY

## 2025-04-21 PROCEDURE — 20610 DRAIN/INJ JOINT/BURSA W/O US: CPT

## 2025-04-21 RX ORDER — TRIAMCINOLONE ACETONIDE 40 MG/ML
40 INJECTION, SUSPENSION INTRA-ARTICULAR; INTRAMUSCULAR ONCE
Status: COMPLETED | OUTPATIENT
Start: 2025-04-21 | End: 2025-04-21

## 2025-04-21 RX ORDER — IOPAMIDOL 612 MG/ML
5 INJECTION, SOLUTION INTRATHECAL
Status: COMPLETED | OUTPATIENT
Start: 2025-04-21 | End: 2025-04-21

## 2025-04-21 RX ORDER — BUPIVACAINE HYDROCHLORIDE 2.5 MG/ML
10 INJECTION, SOLUTION EPIDURAL; INFILTRATION; INTRACAUDAL; PERINEURAL ONCE
Status: COMPLETED | OUTPATIENT
Start: 2025-04-21 | End: 2025-04-21

## 2025-04-21 RX ADMIN — IOPAMIDOL 5 ML: 612 INJECTION, SOLUTION INTRATHECAL at 12:43

## 2025-04-21 RX ADMIN — BUPIVACAINE HYDROCHLORIDE 25 MG: 2.5 INJECTION, SOLUTION EPIDURAL; INFILTRATION; INTRACAUDAL; PERINEURAL at 12:43

## 2025-04-21 RX ADMIN — TRIAMCINOLONE ACETONIDE 40 MG: 40 INJECTION, SUSPENSION INTRA-ARTICULAR; INTRAMUSCULAR at 12:43

## 2025-04-21 NOTE — PROGRESS NOTES
Right hip injection    1225 - Patient admitted to x-ray specials room ambulatory, gait steady without assist.  Permit obtained. Patient positioned, secured and prepped for procedure. Dr. Rios reviewed case with patient. Voices understanding of procedure.       Patient was educated about the amount of radiation used with today's procedure.  Emotional support provided to patient.      Patient reports pain to right hip, denies radiation, denies numbness/tingling    1232 - Start procedure     1237 - End procedure, band aid applied to R hip    1238 - Patient sitting at the side of the table.  Patient denies any dizziness/lightheadedness.  Patient denies any pain, denies numbness/tingling of legs/feet.  Ambulatory, Gait steady.    1240 - Patient taken to x-ray waiting area to wait with .     Educated patient to call with any questions/concerns, voices understanding. Discharged home with  at this time.  No complaints of post injection.

## 2025-06-06 DIAGNOSIS — M16.11 PRIMARY OSTEOARTHRITIS OF RIGHT HIP: Primary | ICD-10-CM

## 2025-06-06 RX ORDER — DICLOFENAC SODIUM 75 MG/1
75 TABLET, DELAYED RELEASE ORAL 2 TIMES DAILY
COMMUNITY
End: 2025-06-06 | Stop reason: SDUPTHER

## 2025-06-06 NOTE — TELEPHONE ENCOUNTER
Name of Medication(s) Requested:  Requested Prescriptions     Pending Prescriptions Disp Refills    diclofenac (VOLTAREN) 75 MG EC tablet 60 tablet 0     Sig: Take 1 tablet by mouth 2 times daily       Dosage and directions were verified? Yes    Pharmacy Verified?  Yes

## 2025-06-09 RX ORDER — DICLOFENAC SODIUM 75 MG/1
75 TABLET, DELAYED RELEASE ORAL 2 TIMES DAILY
Qty: 60 TABLET | Refills: 0 | Status: SHIPPED | OUTPATIENT
Start: 2025-06-09

## 2025-06-19 ENCOUNTER — OFFICE VISIT (OUTPATIENT)
Age: 75
End: 2025-06-19
Payer: MEDICARE

## 2025-06-19 ENCOUNTER — PREP FOR PROCEDURE (OUTPATIENT)
Age: 75
End: 2025-06-19

## 2025-06-19 ENCOUNTER — HOSPITAL ENCOUNTER (OUTPATIENT)
Age: 75
Setting detail: OUTPATIENT SURGERY
End: 2025-06-19
Attending: ORTHOPAEDIC SURGERY | Admitting: ORTHOPAEDIC SURGERY
Payer: MEDICARE

## 2025-06-19 VITALS — BODY MASS INDEX: 23.74 KG/M2 | HEIGHT: 63 IN | WEIGHT: 134 LBS

## 2025-06-19 DIAGNOSIS — Z01.818 PRE-OP TESTING: Primary | ICD-10-CM

## 2025-06-19 DIAGNOSIS — M16.11 PRIMARY OSTEOARTHRITIS OF RIGHT HIP: Primary | ICD-10-CM

## 2025-06-19 PROCEDURE — 99213 OFFICE O/P EST LOW 20 MIN: CPT | Performed by: ORTHOPAEDIC SURGERY

## 2025-06-19 PROCEDURE — 1123F ACP DISCUSS/DSCN MKR DOCD: CPT | Performed by: ORTHOPAEDIC SURGERY

## 2025-06-19 NOTE — PATIENT INSTRUCTIONS
Discussed with Patient ...     ** Procedure of R total hip on 7/30/2025 **    ** Stop all NSAIDS & Blood Thinners 5 days prior **    ** Stop all meals before 12 midnight prior to surgery **    ** [facility] will call with time of procedure **    ** Patient will need a ride after surgery **     ** Pre - op and Post - op appointment given **    ** Cardiac clearance and Medical clearance paperwork given to Pt to have filled out**       AS post op AVR sp   AVR T

## 2025-06-19 NOTE — PROGRESS NOTES
Chief Complaint   Patient presents with    Follow-up     Patient presents into the office today for a follow up of the R hip. She notes the injection she received provided slight relief for a few weeks.     Hip Pain     She describes the pain as sharp. Patient reports pain in the groin. She treats the pain with Voltaren / ibuprofen as needed. Patient rates the level of pain at a 6 / 10        Susana Pedro returns today for follow-up of right hip.  The pain is getting worse and she is limping more severely.  She is ready for total hip.      Past Medical History:   Diagnosis Date    Hyperlipidemia     Hypertension     Neuropathy      Past Surgical History:   Procedure Laterality Date    APPENDECTOMY      CERVICAL FUSION N/A 11/28/2022    C3-C4,C4-C5,C5-C6 ANTERIOR CERVICAL DISCECTOMY AND FUSION [place] [facility] [place]YE SURGERY      FOOT SURGERY Bilateral     FRACTURE SURGERY      HERNIA REPAIR      TONSILLECTOMY         Current Outpatient Medications:     diclofenac (VOLTAREN) 75 MG EC tablet, Take 1 tablet by mouth 2 times daily, Disp: 60 tablet, Rfl: 0    aspirin 81 MG EC tablet, Take 1 tablet by mouth daily, Disp: , Rfl:     magnesium hydroxide (MILK OF MAGNESIA) 400 MG/5ML suspension, Take by mouth daily as needed for Constipation, Disp: , Rfl:     gabapentin (NEURONTIN) 300 MG capsule, Take 1 capsule by mouth in the morning, at noon, and at bedtime., Disp: , Rfl:     busPIRone (BUSPAR) 15 MG tablet, Take 15 mg by mouth in the morning and at bedtime, Disp: , Rfl:     ergocalciferol (ERGOCALCIFEROL) 1.25 MG (72220 UT) capsule, Take 1 capsule by mouth once a week Fridays, Disp: , Rfl:     albuterol sulfate HFA (PROVENTIL;VENTOLIN;PROAIR) 108 (90 Base) MCG/ACT inhaler, Inhale 2 puffs every 4 hours by inhalation route as directed for 90 days., Disp: , Rfl:     alendronate (FOSAMAX) 70 MG tablet, Tuesdays, Disp: , Rfl:     buPROPion (WELLBUTRIN SR) 150 MG extended release tablet, Take 1

## 2025-07-08 DIAGNOSIS — M16.11 PRIMARY OSTEOARTHRITIS OF RIGHT HIP: ICD-10-CM

## 2025-07-08 RX ORDER — DICLOFENAC SODIUM 75 MG/1
75 TABLET, DELAYED RELEASE ORAL 2 TIMES DAILY
Qty: 60 TABLET | Refills: 0 | Status: SHIPPED | OUTPATIENT
Start: 2025-07-08

## 2025-07-21 ENCOUNTER — OFFICE VISIT (OUTPATIENT)
Age: 75
End: 2025-07-21
Payer: MEDICARE

## 2025-07-21 ENCOUNTER — TELEPHONE (OUTPATIENT)
Age: 75
End: 2025-07-21

## 2025-07-21 VITALS — WEIGHT: 134 LBS | TEMPERATURE: 98.2 F | HEIGHT: 62 IN | BODY MASS INDEX: 24.66 KG/M2

## 2025-07-21 DIAGNOSIS — M16.11 PRIMARY OSTEOARTHRITIS OF RIGHT HIP: Primary | ICD-10-CM

## 2025-07-21 PROCEDURE — 1123F ACP DISCUSS/DSCN MKR DOCD: CPT | Performed by: ORTHOPAEDIC SURGERY

## 2025-07-21 PROCEDURE — 99213 OFFICE O/P EST LOW 20 MIN: CPT | Performed by: ORTHOPAEDIC SURGERY

## 2025-07-21 NOTE — TELEPHONE ENCOUNTER
Dr. Hou' office called stating they sent over a medical clearance for patients upcoming surgery but that they were pulling the clearance back until patient has CT of chest done.  She stated that something showed (could possibly be pneumonia) but needed to be check before clearing her.  Stated the she was cardiac cleared already.     She stated that once CT comes back they will resend clearance to us.      Notified [facility] of this call

## 2025-07-21 NOTE — PROGRESS NOTES
Chief Complaint   Patient presents with    Pre-op Exam     Pt presents to office today for Pre-op of right hip.        Susana Pedro returns today for follow-up of right hip.  She is ready for total hip.  She does have to get clearance finalized with Dr. Hou which is in process..      Past Medical History:   Diagnosis Date    Hyperlipidemia     Hypertension     Neuropathy      Past Surgical History:   Procedure Laterality Date    APPENDECTOMY      CERVICAL FUSION N/A 11/28/2022    C3-C4,C4-C5,C5-C6 ANTERIOR CERVICAL DISCECTOMY AND FUSION performed by Stephen Padilla [place] [facility] [place]    EYE SURGERY      FOOT SURGERY Bilateral     FRACTURE SURGERY      HERNIA REPAIR      TONSILLECTOMY         Current Outpatient Medications:     diclofenac (VOLTAREN) 75 MG EC tablet, TAKE 1 TABLET BY MOUTH TWICE DAILY, Disp: 60 tablet, Rfl: 0    aspirin 81 MG EC tablet, Take 1 tablet by mouth daily, Disp: , Rfl:     magnesium hydroxide (MILK OF MAGNESIA) 400 MG/5ML suspension, Take by mouth daily as needed for Constipation, Disp: , Rfl:     gabapentin (NEURONTIN) 300 MG capsule, Take 1 capsule by mouth in the morning, at noon, and at bedtime., Disp: , Rfl:     busPIRone (BUSPAR) 15 MG tablet, Take 15 mg by mouth in the morning and at bedtime, Disp: , Rfl:     ergocalciferol (ERGOCALCIFEROL) 1.25 MG (43023 UT) capsule, Take 1 capsule by mouth once a week Fridays, Disp: , Rfl:     albuterol sulfate HFA (PROVENTIL;VENTOLIN;PROAIR) 108 (90 Base) MCG/ACT inhaler, Inhale 2 puffs every 4 hours by inhalation route as directed for 90 days., Disp: , Rfl:     alendronate (FOSAMAX) 70 MG tablet, Tuesdays, Disp: , Rfl:     buPROPion (WELLBUTRIN SR) 150 MG extended release tablet, Take 1 tablet by mouth daily, Disp: , Rfl:     losartan (COZAAR) 50 MG tablet, Take 1 tablet by mouth daily, Disp: , Rfl:     pravastatin (PRAVACHOL) 40 MG tablet, Take 1 tablet by mouth daily, Disp: , Rfl:   No Known Allergies  Social History     Socioeconomic History

## 2025-07-23 ENCOUNTER — HOSPITAL ENCOUNTER (OUTPATIENT)
Dept: PREADMISSION TESTING | Age: 75
Discharge: HOME | End: 2025-07-23
Payer: MEDICARE

## 2025-07-23 VITALS
TEMPERATURE: 97.3 F | SYSTOLIC BLOOD PRESSURE: 126 MMHG | OXYGEN SATURATION: 96 % | DIASTOLIC BLOOD PRESSURE: 75 MMHG | WEIGHT: 132 LBS | HEIGHT: 62 IN | HEART RATE: 76 BPM | BODY MASS INDEX: 24.29 KG/M2 | RESPIRATION RATE: 18 BRPM

## 2025-07-23 DIAGNOSIS — Z01.818 PRE-OP TESTING: Primary | ICD-10-CM

## 2025-07-23 LAB
HBA1C MFR BLD: 5.5 % (ref 4–5.6)
INR PPP: 0.9
PARTIAL THROMBOPLASTIN TIME: 28.7 SEC (ref 24.5–35.1)
PREALB SERPL-MCNC: 28 MG/DL (ref 20–40)
PROTHROMBIN TIME: 10 SEC (ref 9.3–12.4)

## 2025-07-23 PROCEDURE — 84134 ASSAY OF PREALBUMIN: CPT

## 2025-07-23 PROCEDURE — 85730 THROMBOPLASTIN TIME PARTIAL: CPT

## 2025-07-23 PROCEDURE — 87081 CULTURE SCREEN ONLY: CPT

## 2025-07-23 PROCEDURE — 83036 HEMOGLOBIN GLYCOSYLATED A1C: CPT

## 2025-07-23 PROCEDURE — 85610 PROTHROMBIN TIME: CPT

## 2025-07-23 RX ORDER — TRAZODONE HYDROCHLORIDE 50 MG/1
50 TABLET ORAL NIGHTLY PRN
COMMUNITY

## 2025-07-23 RX ORDER — MAGNESIUM GLUCONATE 27 MG(500)
500 TABLET ORAL 2 TIMES DAILY
COMMUNITY

## 2025-07-23 RX ORDER — OMEPRAZOLE 10 MG/1
10 CAPSULE, DELAYED RELEASE ORAL DAILY
COMMUNITY

## 2025-07-23 ASSESSMENT — PROMIS GLOBAL HEALTH SCALE
IN GENERAL, WOULD YOU SAY YOUR QUALITY OF LIFE IS...[ON A SCALE OF 1 (POOR) TO 5 (EXCELLENT)]: GOOD
HOW IS THE PROMIS V1.1 BEING ADMINISTERED?: PAPER
IN GENERAL, HOW WOULD YOU RATE YOUR SATISFACTION WITH YOUR SOCIAL ACTIVITIES AND RELATIONSHIPS [ON A SCALE OF 1 (POOR) TO 5 (EXCELLENT)]?: VERY GOOD
IN GENERAL, PLEASE RATE HOW WELL YOU CARRY OUT YOUR USUAL SOCIAL ACTIVITIES (INCLUDES ACTIVITIES AT HOME, AT WORK, AND IN YOUR COMMUNITY, AND RESPONSIBILITIES AS A PARENT, CHILD, SPOUSE, EMPLOYEE, FRIEND, ETC) [ON A SCALE OF 1 (POOR) TO 5 (EXCELLENT)]?: VERY GOOD
IN THE PAST 7 DAYS, HOW WOULD YOU RATE YOUR FATIGUE ON AVERAGE [ON A SCALE FROM 1 (NONE) TO 5 (VERY SEVERE)]?: MODERATE
SUM OF RESPONSES TO QUESTIONS 2, 4, 5, & 10: 13
IN GENERAL, HOW WOULD YOU RATE YOUR MENTAL HEALTH, INCLUDING YOUR MOOD AND YOUR ABILITY TO THINK [ON A SCALE OF 1 (POOR) TO 5 (EXCELLENT)]?: FAIR
IN GENERAL, WOULD YOU SAY YOUR HEALTH IS...[ON A SCALE OF 1 (POOR) TO 5 (EXCELLENT)]: GOOD
WHO IS THE PERSON COMPLETING THE PROMIS V1.1 SURVEY?: SELF
IN GENERAL, HOW WOULD YOU RATE YOUR PHYSICAL HEALTH [ON A SCALE OF 1 (POOR) TO 5 (EXCELLENT)]?: GOOD
IN THE PAST 7 DAYS, HOW OFTEN HAVE YOU BEEN BOTHERED BY EMOTIONAL PROBLEMS, SUCH AS FEELING ANXIOUS, DEPRESSED, OR IRRITABLE [ON A SCALE FROM 1 (NEVER) TO 5 (ALWAYS)]?: RARELY
IN THE PAST 7 DAYS, HOW WOULD YOU RATE YOUR PAIN ON AVERAGE [ON A SCALE FROM 0 (NO PAIN) TO 10 (WORST IMAGINABLE PAIN)]?: 8
SUM OF RESPONSES TO QUESTIONS 3, 6, 7, & 8: 16
TO WHAT EXTENT ARE YOU ABLE TO CARRY OUT YOUR EVERYDAY PHYSICAL ACTIVITIES SUCH AS WALKING, CLIMBING STAIRS, CARRYING GROCERIES, OR MOVING A CHAIR [ON A SCALE OF 1 (NOT AT ALL) TO 5 (COMPLETELY)]?: A LITTLE

## 2025-07-23 ASSESSMENT — HOOS JR
LYING IN BED (TURNING OVER, MAINTAINING HIP POSITION): MODERATE
BENDING TO THE FLOOR TO PICK UP OBJECT: EXTREME
SITTING: SEVERE
HOOS JR TOTAL INTERVAL SCORE: 46.652
WALKING ON UNEVEN SURFACE: MODERATE
HOOS JR RAW SCORE: 14
HOOS JR RAW SCORE: 14
RISING FROM SITTING: SEVERE

## 2025-07-23 ASSESSMENT — PAIN DESCRIPTION - DESCRIPTORS: DESCRIPTORS: SHARP

## 2025-07-23 ASSESSMENT — PAIN - FUNCTIONAL ASSESSMENT: PAIN_FUNCTIONAL_ASSESSMENT: PREVENTS OR INTERFERES SOME ACTIVE ACTIVITIES AND ADLS

## 2025-07-23 ASSESSMENT — PAIN DESCRIPTION - ORIENTATION: ORIENTATION: RIGHT

## 2025-07-23 ASSESSMENT — PAIN SCALES - GENERAL: PAINLEVEL_OUTOF10: 10

## 2025-07-23 ASSESSMENT — PAIN DESCRIPTION - LOCATION: LOCATION: HIP

## 2025-07-23 ASSESSMENT — PAIN DESCRIPTION - PAIN TYPE: TYPE: CHRONIC PAIN

## 2025-07-23 NOTE — PROGRESS NOTES
Patient attended preoperative Total Joint Camp on 7/23/25.  Patient is scheduled to have an elective hip replacement.  Patient was educated regarding Disease Process, Medications, Smoking Cessation, Oxygenation, Incentive Spirometry and Deep Breath and Cough, signs and symptoms of postoperative joint infection that include: Fever, Chills, Pain Control, Drainage and Redness, post-op follow up with orthopaedic surgeon, dressing removal, staple removal, ambulatory devices which include a wheeled walker and cane, bed mobility, correct anatomical alignment, active range of motion, proper transferring technique, incision care, infection prevention measures, non-pharmacologic comfort measures, notification of inadequate pain control measures, pain scale for assessing level of pain, pharmacologic pain management, relaxation techniques.

## 2025-07-23 NOTE — PROGRESS NOTES
[facility]                                                                                                                    PRE OP INSTRUCTIONS FOR  Susana Pedro        Date: 7/23/2025    Date of surgery: 7/30/25  Arrival Time:0530  [facility] will call you between 5pm and 7pm with your final arrival time for surgery    You may drink clear liquids up until 2 hours before your procedure. Clear liquids include water, black coffee, and apple juice. No solid foods for 8 hours pre procedure.     Total joint replacement- The night before surgery drink 2- 24 oz bottles of regular Gatorade at bedtime. Drink both within 10-15 minutes. (Insulin dependent patients drink 2- 24 oz bottles of sugar free Gatorade.) May have liquids up to 2 hours prior to surgery.      Take the following medications with a small sip of water on the morning of Surgery: gabapentin, Buspar, Wellbutrin, inhaler     Aspirin, Ibuprofen, Advil, Naproxen, Vitamin E and other Anti-inflammatory products should be stopped  before surgery  as directed by your physician.  Take Tylenol only unless instructed otherwise by your surgeon. Stop all herbal supplements 5 days pre op.     Do not smoke,use illicit drugs and do not drink any alcoholic beverages 24 hours prior to surgery.    You may brush your teeth the morning of surgery.      You MUST make arrangements for a responsible adult to take you home after your surgery. You will not be allowed to leave alone or drive yourself home.  It is strongly suggested someone stay with you the first 24 hrs. Your surgery will be cancelled if you do not have a ride home.    Please wear simple, loose fitting clothing to the hospital.  Do not bring valuables (money, credit cards, checkbooks, etc.) Do not wear any makeup (including no eye makeup) or nail polish on your fingers or toes.    DO NOT wear any jewelry or piercings on day of surgery.  All body piercing jewelry must be removed.    Shower

## 2025-07-23 NOTE — CARE COORDINATION
07/23/25 1502   Social/Functional History   Lives With Alone   Type of Home House   Home Layout One level   Home Access Level entry   Bathroom Shower/Tub Tub/Shower unit   Bathroom Equipment Toilet raiser;Grab bars around toilet   Home Equipment Walker - Rolling   Receives Help From Family   Condition of Participation: Discharge Planning   The Plan for Transition of Care is related to the following treatment goals: HHC   The Patient and/or Patient Representative was provided with a Choice of Provider? Patient   The Patient and/Or Patient Representative agree with the Discharge Plan? Yes   Freedom of Choice list was provided with basic dialogue that supports the patient's individualized plan of care/goals, treatment preferences, and shares the quality data associated with the providers?  Yes     7/23/2020: SS NOTE:  Met with pt in PAT, pt having surgery for a elective total hip arthroplasty on 7/30, reviewed rehab needs and providers for [employer] insurance, pt plans to return home day of surgery from [facility] , pt will have help from her cousin, Bree who will transport her home, pt prefers [facility], referral made to Julian, agency liaison who accepted pt and will follow post-op for home PT orders, pt has dme , will sponge bathe or get ttb on her own if needed, Nursing informed for HHC order needed post-op. Electronically signed by LUISITO Jasso on 7/23/2025 at 3:01 PM

## 2025-07-24 LAB
BACTERIA URNS QL MICRO: ABNORMAL
BILIRUB UR QL STRIP: NEGATIVE
CLARITY UR: CLEAR
COLOR UR: YELLOW
GLUCOSE UR STRIP-MCNC: NEGATIVE MG/DL
HGB UR QL STRIP.AUTO: NEGATIVE
KETONES UR STRIP-MCNC: NEGATIVE MG/DL
LEUKOCYTE ESTERASE UR QL STRIP: NEGATIVE
NITRITE UR QL STRIP: NEGATIVE
PH UR STRIP: 6.5 [PH] (ref 5–8)
PROT UR STRIP-MCNC: NEGATIVE MG/DL
RBC #/AREA URNS HPF: ABNORMAL /HPF
SP GR UR STRIP: <1.005 (ref 1–1.03)
UROBILINOGEN UR STRIP-ACNC: 0.2 EU/DL (ref 0–1)
WBC #/AREA URNS HPF: ABNORMAL /HPF

## 2025-07-24 PROCEDURE — 81001 URINALYSIS AUTO W/SCOPE: CPT

## 2025-07-24 PROCEDURE — 87086 URINE CULTURE/COLONY COUNT: CPT

## 2025-07-25 LAB
MICROORGANISM SPEC CULT: ABNORMAL
MICROORGANISM SPEC CULT: NORMAL
SERVICE CMNT-IMP: ABNORMAL
SPECIMEN DESCRIPTION: ABNORMAL
SPECIMEN DESCRIPTION: NORMAL

## 2025-08-08 ENCOUNTER — TRANSCRIBE ORDERS (OUTPATIENT)
Dept: ADMINISTRATIVE | Age: 75
End: 2025-08-08

## 2025-08-08 DIAGNOSIS — R91.1 LUNG NODULE: Primary | ICD-10-CM

## 2025-08-20 ENCOUNTER — HOSPITAL ENCOUNTER (OUTPATIENT)
Dept: PULMONOLOGY | Age: 75
Discharge: HOME | End: 2025-08-20
Attending: INTERNAL MEDICINE
Payer: MEDICARE

## 2025-08-20 ENCOUNTER — HOSPITAL ENCOUNTER (OUTPATIENT)
Dept: PET IMAGING | Age: 75
Discharge: HOME | End: 2025-08-22
Attending: INTERNAL MEDICINE
Payer: MEDICARE

## 2025-08-20 DIAGNOSIS — R91.1 LUNG NODULE: ICD-10-CM

## 2025-08-20 LAB — GLUCOSE BLD-MCNC: 123 MG/DL (ref 74–99)

## 2025-08-20 PROCEDURE — 3430000000 HC RX DIAGNOSTIC RADIOPHARMACEUTICAL: Performed by: RADIOLOGY

## 2025-08-20 PROCEDURE — 94729 DIFFUSING CAPACITY: CPT

## 2025-08-20 PROCEDURE — 94060 EVALUATION OF WHEEZING: CPT

## 2025-08-20 PROCEDURE — 82962 GLUCOSE BLOOD TEST: CPT

## 2025-08-20 PROCEDURE — 94726 PLETHYSMOGRAPHY LUNG VOLUMES: CPT

## 2025-08-20 PROCEDURE — A9609 HC RX DIAGNOSTIC RADIOPHARMACEUTICAL: HCPCS | Performed by: RADIOLOGY

## 2025-08-20 RX ORDER — FLUDEOXYGLUCOSE F 18 200 MCI/ML
15 INJECTION, SOLUTION INTRAVENOUS
Status: DISCONTINUED | OUTPATIENT
Start: 2025-08-20 | End: 2025-08-20

## 2025-08-27 ENCOUNTER — HOSPITAL ENCOUNTER (OUTPATIENT)
Dept: PET IMAGING | Age: 75
Discharge: HOME | End: 2025-08-29
Attending: INTERNAL MEDICINE
Payer: MEDICARE

## 2025-08-27 LAB — GLUCOSE BLD-MCNC: 113 MG/DL (ref 74–99)

## 2025-08-27 PROCEDURE — 82962 GLUCOSE BLOOD TEST: CPT

## 2025-08-27 PROCEDURE — 3430000000 HC RX DIAGNOSTIC RADIOPHARMACEUTICAL: Performed by: RADIOLOGY

## 2025-08-27 PROCEDURE — 78815 PET IMAGE W/CT SKULL-THIGH: CPT

## 2025-08-27 PROCEDURE — A9609 HC RX DIAGNOSTIC RADIOPHARMACEUTICAL: HCPCS | Performed by: RADIOLOGY

## 2025-08-27 RX ORDER — FLUDEOXYGLUCOSE F 18 200 MCI/ML
15 INJECTION, SOLUTION INTRAVENOUS
Status: COMPLETED | OUTPATIENT
Start: 2025-08-27 | End: 2025-08-27

## 2025-08-27 RX ADMIN — FLUDEOXYGLUCOSE F 18 15 MILLICURIE: 200 INJECTION, SOLUTION INTRAVENOUS at 10:43

## 2025-08-28 ENCOUNTER — TELEPHONE (OUTPATIENT)
Dept: CARDIOTHORACIC SURGERY | Age: 75
End: 2025-08-28

## (undated) DEVICE — GLOVE SURG 8.5 PF POLYMER WHT STRL SIGN LTX ESSENTIAL LTX

## (undated) DEVICE — CASPAR DISTR PIN12MMSTER: Brand: AESCULAP

## (undated) DEVICE — E-Z CLEAN, NON-STICK, PTFE COATED, ELECTROSURGICAL BLADE ELECTRODE, MODIFIED EXTENDED INSULATION, 2.5 INCH (6.35 CM): Brand: MEGADYNE

## (undated) DEVICE — 3.0MM PRECISION NEURO (MATCH HEAD)

## (undated) DEVICE — DRAPE,REIN 53X77,STERILE: Brand: MEDLINE

## (undated) DEVICE — CORD,CAUTERY,BIPOLAR,STERILE: Brand: MEDLINE

## (undated) DEVICE — DRILL BIT, 12MM

## (undated) DEVICE — READY WET SKIN SCRUB TRAY-LF: Brand: MEDLINE INDUSTRIES, INC.

## (undated) DEVICE — BANDAGE,GAUZE,4.5"X4.1YD,STERILE,LF: Brand: MEDLINE

## (undated) DEVICE — Device

## (undated) DEVICE — NEEDLE HYPO 18GA L1.5IN PNK POLYPR HUB S STL REG BVL STR

## (undated) DEVICE — BLADE CLIPPER GEN PURP NS

## (undated) DEVICE — GLOVE ORANGE PI 8   MSG9080

## (undated) DEVICE — ELECTRODE PT RET AD L9FT HI MOIST COND ADH HYDRGEL CORDED

## (undated) DEVICE — Z INACTIVE USE 2855094 SPONGE SURG W3 8IN WHT COT RND PNUT DISECT W COUNT CRD RADPQ

## (undated) DEVICE — 3M(TM) MEDIPORE(TM) +PAD SOFT CLOTH ADHESIVE WOUND DRESSING 3569: Brand: 3M™ MEDIPORE™

## (undated) DEVICE — Z INACTIVE USE 2855095 TOWEL SURG W17XL27IN BLU COT DLX PREWASHED DELINTED 10 PER PK

## (undated) DEVICE — TUBING, SUCTION, 3/16" X 12', STRAIGHT: Brand: MEDLINE

## (undated) DEVICE — TUBING SUCT 12FR MAL ALUM SHFT FN CAP VENT UNIV CONN W/ OBT

## (undated) DEVICE — LUMBAR LAMINECTOMY: Brand: MEDLINE INDUSTRIES, INC.

## (undated) DEVICE — ADHESIVE SKIN CLOSURE TOP 36 CC HI VISC DERMBND MINI

## (undated) DEVICE — 3M™ IOBAN™ 2 ANTIMICROBIAL INCISE DRAPE 6640EZ: Brand: IOBAN™ 2

## (undated) DEVICE — NEEDLE SPNL L3.5IN PNK HUB S STL REG WALL FIT STYL W/ QNCKE

## (undated) DEVICE — SPONGE,DISSECTOR,K,XRAY,9/16"X1/4",STRL: Brand: MEDLINE

## (undated) DEVICE — SPONGE,DISSECTOR,ROUND CHERRY,XR,ST,5/PK: Brand: MEDLINE